# Patient Record
Sex: FEMALE | Race: BLACK OR AFRICAN AMERICAN | Employment: PART TIME | URBAN - METROPOLITAN AREA
[De-identification: names, ages, dates, MRNs, and addresses within clinical notes are randomized per-mention and may not be internally consistent; named-entity substitution may affect disease eponyms.]

---

## 2017-04-15 PROBLEM — K80.20 SYMPTOMATIC CHOLELITHIASIS: Status: ACTIVE | Noted: 2017-04-15

## 2018-10-17 PROBLEM — E66.01 CLASS 3 SEVERE OBESITY DUE TO EXCESS CALORIES WITHOUT SERIOUS COMORBIDITY WITH BODY MASS INDEX (BMI) OF 40.0 TO 44.9 IN ADULT (HCC): Status: ACTIVE | Noted: 2018-10-17

## 2018-10-17 PROBLEM — E66.813 CLASS 3 SEVERE OBESITY DUE TO EXCESS CALORIES WITHOUT SERIOUS COMORBIDITY WITH BODY MASS INDEX (BMI) OF 40.0 TO 44.9 IN ADULT: Status: ACTIVE | Noted: 2018-10-17

## 2018-10-17 PROBLEM — I10 ESSENTIAL HYPERTENSION: Status: ACTIVE | Noted: 2018-10-17

## 2018-10-17 PROBLEM — K21.9 GASTROESOPHAGEAL REFLUX DISEASE: Status: ACTIVE | Noted: 2018-10-17

## 2019-09-19 VITALS — OXYGEN SATURATION: 97 % | TEMPERATURE: 97.2 F | HEART RATE: 77 BPM

## 2019-09-20 ENCOUNTER — HOSPITAL ENCOUNTER (EMERGENCY)
Age: 24
Discharge: LEFT BEFORE DISCHARGE | End: 2019-09-20

## 2020-01-24 ENCOUNTER — HOSPITAL ENCOUNTER (EMERGENCY)
Age: 25
Discharge: HOME | End: 2020-01-25
Attending: EMERGENCY MEDICINE

## 2020-01-24 ENCOUNTER — APPOINTMENT (OUTPATIENT)
Dept: GENERAL RADIOLOGY | Age: 25
End: 2020-01-24

## 2020-01-24 VITALS
DIASTOLIC BLOOD PRESSURE: 87 MMHG | SYSTOLIC BLOOD PRESSURE: 142 MMHG | OXYGEN SATURATION: 100 % | HEIGHT: 64 IN | WEIGHT: 260 LBS | RESPIRATION RATE: 18 BRPM | HEART RATE: 87 BPM | TEMPERATURE: 97.6 F | BODY MASS INDEX: 44.39 KG/M2

## 2020-01-24 PROCEDURE — 93005 ELECTROCARDIOGRAM TRACING: CPT | Performed by: EMERGENCY MEDICINE

## 2020-01-24 PROCEDURE — 96374 THER/PROPH/DIAG INJ IV PUSH: CPT

## 2020-01-24 PROCEDURE — 71046 X-RAY EXAM CHEST 2 VIEWS: CPT

## 2020-01-24 PROCEDURE — 6360000002 HC RX W HCPCS: Performed by: EMERGENCY MEDICINE

## 2020-01-24 PROCEDURE — 80048 BASIC METABOLIC PNL TOTAL CA: CPT

## 2020-01-24 PROCEDURE — 2580000003 HC RX 258: Performed by: EMERGENCY MEDICINE

## 2020-01-24 PROCEDURE — 99285 EMERGENCY DEPT VISIT HI MDM: CPT

## 2020-01-24 PROCEDURE — 82553 CREATINE MB FRACTION: CPT

## 2020-01-24 PROCEDURE — 84484 ASSAY OF TROPONIN QUANT: CPT

## 2020-01-24 PROCEDURE — 82550 ASSAY OF CK (CPK): CPT

## 2020-01-24 PROCEDURE — 85378 FIBRIN DEGRADE SEMIQUANT: CPT

## 2020-01-24 RX ORDER — 0.9 % SODIUM CHLORIDE 0.9 %
1000 INTRAVENOUS SOLUTION INTRAVENOUS ONCE
Status: COMPLETED | OUTPATIENT
Start: 2020-01-24 | End: 2020-01-25

## 2020-01-24 RX ORDER — KETOROLAC TROMETHAMINE 30 MG/ML
15 INJECTION, SOLUTION INTRAMUSCULAR; INTRAVENOUS ONCE
Status: COMPLETED | OUTPATIENT
Start: 2020-01-24 | End: 2020-01-24

## 2020-01-24 RX ADMIN — SODIUM CHLORIDE 1000 ML: 9 INJECTION, SOLUTION INTRAVENOUS at 23:48

## 2020-01-24 RX ADMIN — KETOROLAC TROMETHAMINE 15 MG: 30 INJECTION, SOLUTION INTRAMUSCULAR; INTRAVENOUS at 23:48

## 2020-01-24 ASSESSMENT — PAIN SCALES - GENERAL
PAINLEVEL_OUTOF10: 6
PAINLEVEL_OUTOF10: 6

## 2020-01-24 ASSESSMENT — PAIN DESCRIPTION - PAIN TYPE: TYPE: ACUTE PAIN

## 2020-01-25 LAB
ANION GAP SERPL CALCULATED.3IONS-SCNC: 11 MMOL/L (ref 7–16)
BUN BLDV-MCNC: 11 MG/DL (ref 6–20)
CALCIUM SERPL-MCNC: 9.2 MG/DL (ref 8.6–10.2)
CHLORIDE BLD-SCNC: 101 MMOL/L (ref 98–107)
CK MB: 2 NG/ML (ref 0–4.3)
CO2: 23 MMOL/L (ref 22–29)
CREAT SERPL-MCNC: 0.8 MG/DL (ref 0.5–1)
D DIMER: <200 NG/ML DDU
EKG ATRIAL RATE: 98 BPM
EKG P AXIS: 39 DEGREES
EKG P-R INTERVAL: 150 MS
EKG Q-T INTERVAL: 362 MS
EKG QRS DURATION: 82 MS
EKG QTC CALCULATION (BAZETT): 462 MS
EKG R AXIS: 29 DEGREES
EKG T AXIS: 0 DEGREES
EKG VENTRICULAR RATE: 98 BPM
GFR AFRICAN AMERICAN: >60
GFR NON-AFRICAN AMERICAN: >60 ML/MIN/1.73
GLUCOSE BLD-MCNC: 87 MG/DL (ref 74–99)
POTASSIUM SERPL-SCNC: 3.6 MMOL/L (ref 3.5–5)
SODIUM BLD-SCNC: 135 MMOL/L (ref 132–146)
TOTAL CK: 451 U/L (ref 20–180)
TROPONIN: <0.01 NG/ML (ref 0–0.03)

## 2020-01-25 PROCEDURE — 93010 ELECTROCARDIOGRAM REPORT: CPT | Performed by: INTERNAL MEDICINE

## 2020-01-25 RX ORDER — KETOROLAC TROMETHAMINE 10 MG/1
10 TABLET, FILM COATED ORAL EVERY 8 HOURS PRN
Qty: 15 TABLET | Refills: 0 | Status: SHIPPED | OUTPATIENT
Start: 2020-01-25 | End: 2020-05-19

## 2020-01-25 ASSESSMENT — PAIN SCALES - GENERAL: PAINLEVEL_OUTOF10: 2

## 2020-01-25 NOTE — ED NOTES
Pt presting to ED with c/o R sided chest pain that extends into back and SOB that started with the chest pain. Pt also stated that she had numbness and tingling in R hand and R leg at the time, but denies any at this time. No neuro deficits noted. Pt stated she was drinking at the tie on onset. VSS and pt appearrs free of immediate distress. Cardiac monitor applied and continuous pulse ox applied. Boyfriend at bedside.       Amelai Pruett RN  01/25/20 0005

## 2020-01-25 NOTE — ED PROVIDER NOTES
HPI:  1/24/20,   Time: 11:08 PM         Jonel Braun is a 21 y.o. female presenting to the ED for chest pain and shortness of breath, beginning 1 hour ago. The complaint has been constant, moderate in severity, and worsened by movement of torso and also with deep breathing. Prior similar episodes patient denies diaphoresis or dizziness no abdominal pain vomiting or diarrhea no fever chills or night sweats or cough. No recent travel or surgery and is not on birth control    ROS:   Pertinent positives and negatives are stated within HPI, all other systems reviewed and are negative.  --------------------------------------------- PAST HISTORY ---------------------------------------------  Past Medical History:  has a past medical history of ADHD (attention deficit hyperactivity disorder), Hypertension, MDRO (multiple drug resistant organisms) resistance, Obesity, and Thyromegaly. Past Surgical History:  has a past surgical history that includes Cholecystectomy, laparoscopic (05/03/2017). Social History:  reports that she has been smoking. She has never used smokeless tobacco. She reports current alcohol use. She reports that she does not use drugs. Family History: family history includes Coronary Art Dis in her father; Hypertension in her mother. The patients home medications have been reviewed. Allergies: Banana;  Nickel; and Shellfish-derived products    -------------------------------------------------- RESULTS -------------------------------------------------  All laboratory and radiology results have been personally reviewed by myself   LABS:  Results for orders placed or performed during the hospital encounter of 01/24/20   Troponin   Result Value Ref Range    Troponin <0.01 0.00 - 0.03 ng/mL   CK   Result Value Ref Range    Total  (H) 20 - 180 U/L   CK MB   Result Value Ref Range    CK-MB 2.0 0.0 - 4.3 ng/mL   D-Dimer, Quantitative   Result Value Ref Range    D-Dimer, Quant <200 ng/mL DDU   Basic Metabolic Panel   Result Value Ref Range    Sodium 135 132 - 146 mmol/L    Potassium 3.6 3.5 - 5.0 mmol/L    Chloride 101 98 - 107 mmol/L    CO2 23 22 - 29 mmol/L    Anion Gap 11 7 - 16 mmol/L    Glucose 87 74 - 99 mg/dL    BUN 11 6 - 20 mg/dL    CREATININE 0.8 0.5 - 1.0 mg/dL    GFR Non-African American >60 >=60 mL/min/1.73    GFR African American >60     Calcium 9.2 8.6 - 10.2 mg/dL   EKG 12 Lead   Result Value Ref Range    Ventricular Rate 98 BPM    Atrial Rate 98 BPM    P-R Interval 150 ms    QRS Duration 82 ms    Q-T Interval 362 ms    QTc Calculation (Bazett) 462 ms    P Axis 39 degrees    R Axis 29 degrees    T Axis 0 degrees       RADIOLOGY:  Interpreted by Radiologist.  XR CHEST STANDARD (2 VW)    (Results Pending)       ------------------------- NURSING NOTES AND VITALS REVIEWED ---------------------------   The nursing notes within the ED encounter and vital signs as below have been reviewed. BP (!) 142/87   Pulse 87   Temp 97.6 °F (36.4 °C) (Oral)   Resp 18   Ht 5' 4\" (1.626 m)   Wt 260 lb (117.9 kg)   LMP 01/18/2020   SpO2 100%   BMI 44.63 kg/m²   Oxygen Saturation Interpretation: Normal      ---------------------------------------------------PHYSICAL EXAM--------------------------------------      Constitutional/General: Alert and oriented x3, well appearing, non toxic in NAD  Head: NC/AT  Eyes: PERRL, EOMI  Mouth: Oropharynx clear, handling secretions, no trismus  Neck: Supple, full ROM, no meningeal signs  Pulmonary: Lungs clear to auscultation bilaterally, no wheezes, rales, or rhonchi. Not in respiratory distress  Cardiovascular:  Regular rate and rhythm, no murmurs, gallops, or rubs. 2+ distal pulses  Abdomen: Soft, non tender, non distended,   Extremities: Moves all extremities x 4.  Warm and well perfused  Skin: warm and dry without rash  Neurologic: GCS 15,  Psych: Normal Affect  Chest wall: Reproducible right-sided parasternal tenderness to palpation    ------------------------------ ED COURSE/MEDICAL DECISION MAKING----------------------  Medications   ketorolac (TORADOL) injection 15 mg (15 mg Intravenous Given 20 5030)   0.9 % sodium chloride bolus (0 mLs Intravenous Stopped 20 0046)         Medical Decision Making:    Atypical chest pain but also increases with inspiration therefore rule out PE    Counseling: The emergency provider has spoken with the patient and discussed todays results, in addition to providing specific details for the plan of care and counseling regarding the diagnosis and prognosis. Questions are answered at this time and they are agreeable with the plan.      --------------------------------- IMPRESSION AND DISPOSITION ---------------------------------    IMPRESSION  1. Acute chest pain New Problem   2.  Chest wall pain New Problem       DISPOSITION  Disposition: Discharge to home  Patient condition is stable      EKG: Normal sinus rhythm with a rate of 98 bpm, normal EKG, no acute ischemic change or ectopy            Nanette Kirkland MD  [phone]

## 2020-05-19 ENCOUNTER — HOSPITAL ENCOUNTER (EMERGENCY)
Age: 25
Discharge: HOME | End: 2020-05-19
Attending: EMERGENCY MEDICINE
Payer: COMMERCIAL

## 2020-05-19 VITALS
DIASTOLIC BLOOD PRESSURE: 97 MMHG | BODY MASS INDEX: 46.95 KG/M2 | HEART RATE: 92 BPM | HEIGHT: 64 IN | OXYGEN SATURATION: 100 % | TEMPERATURE: 97.9 F | WEIGHT: 275 LBS | RESPIRATION RATE: 18 BRPM | SYSTOLIC BLOOD PRESSURE: 148 MMHG

## 2020-05-19 LAB
ABO/RH: NORMAL
ANION GAP SERPL CALCULATED.3IONS-SCNC: 9 MMOL/L (ref 7–16)
ANTIBODY SCREEN: NORMAL
BACTERIA: ABNORMAL /HPF
BASOPHILS ABSOLUTE: 0.03 E9/L (ref 0–0.2)
BASOPHILS RELATIVE PERCENT: 0.2 % (ref 0–2)
BILIRUBIN URINE: NEGATIVE
BLOOD, URINE: ABNORMAL
BUN BLDV-MCNC: 7 MG/DL (ref 6–20)
CALCIUM SERPL-MCNC: 8.9 MG/DL (ref 8.6–10.2)
CHLORIDE BLD-SCNC: 102 MMOL/L (ref 98–107)
CLARITY: CLEAR
CO2: 20 MMOL/L (ref 22–29)
COLOR: YELLOW
CREAT SERPL-MCNC: 0.7 MG/DL (ref 0.5–1)
EOSINOPHILS ABSOLUTE: 0.21 E9/L (ref 0.05–0.5)
EOSINOPHILS RELATIVE PERCENT: 1.5 % (ref 0–6)
EPITHELIAL CELLS, UA: ABNORMAL /HPF
GFR AFRICAN AMERICAN: >60
GFR NON-AFRICAN AMERICAN: >60 ML/MIN/1.73
GLUCOSE BLD-MCNC: 99 MG/DL (ref 74–99)
GLUCOSE URINE: NEGATIVE MG/DL
HCT VFR BLD CALC: 35.8 % (ref 34–48)
HEMOGLOBIN: 11 G/DL (ref 11.5–15.5)
IMMATURE GRANULOCYTES #: 0.06 E9/L
IMMATURE GRANULOCYTES %: 0.4 % (ref 0–5)
KETONES, URINE: NEGATIVE MG/DL
LEUKOCYTE ESTERASE, URINE: ABNORMAL
LYMPHOCYTES ABSOLUTE: 3.68 E9/L (ref 1.5–4)
LYMPHOCYTES RELATIVE PERCENT: 27.1 % (ref 20–42)
MCH RBC QN AUTO: 20.6 PG (ref 26–35)
MCHC RBC AUTO-ENTMCNC: 30.7 % (ref 32–34.5)
MCV RBC AUTO: 67.2 FL (ref 80–99.9)
MONOCYTES ABSOLUTE: 0.69 E9/L (ref 0.1–0.95)
MONOCYTES RELATIVE PERCENT: 5.1 % (ref 2–12)
NEUTROPHILS ABSOLUTE: 8.9 E9/L (ref 1.8–7.3)
NEUTROPHILS RELATIVE PERCENT: 65.7 % (ref 43–80)
NITRITE, URINE: NEGATIVE
PDW BLD-RTO: 17.4 FL (ref 11.5–15)
PH UA: 6.5 (ref 5–9)
PLATELET # BLD: 307 E9/L (ref 130–450)
PMV BLD AUTO: 10.1 FL (ref 7–12)
POTASSIUM REFLEX MAGNESIUM: 4.3 MMOL/L (ref 3.5–5)
PROTEIN UA: NEGATIVE MG/DL
RBC # BLD: 5.33 E12/L (ref 3.5–5.5)
RBC UA: ABNORMAL /HPF (ref 0–2)
SODIUM BLD-SCNC: 131 MMOL/L (ref 132–146)
SPECIFIC GRAVITY UA: 1.02 (ref 1–1.03)
UROBILINOGEN, URINE: 0.2 E.U./DL
WBC # BLD: 13.6 E9/L (ref 4.5–11.5)
WBC UA: ABNORMAL /HPF (ref 0–5)

## 2020-05-19 PROCEDURE — 81001 URINALYSIS AUTO W/SCOPE: CPT

## 2020-05-19 PROCEDURE — 36415 COLL VENOUS BLD VENIPUNCTURE: CPT

## 2020-05-19 PROCEDURE — 4500000002 HC ER NO CHARGE

## 2020-05-19 PROCEDURE — 86900 BLOOD TYPING SEROLOGIC ABO: CPT

## 2020-05-19 PROCEDURE — 86850 RBC ANTIBODY SCREEN: CPT

## 2020-05-19 PROCEDURE — 80048 BASIC METABOLIC PNL TOTAL CA: CPT

## 2020-05-19 PROCEDURE — 85025 COMPLETE CBC W/AUTO DIFF WBC: CPT

## 2020-05-19 PROCEDURE — 86901 BLOOD TYPING SEROLOGIC RH(D): CPT

## 2020-05-19 ASSESSMENT — ENCOUNTER SYMPTOMS
COUGH: 0
ABDOMINAL PAIN: 1
DIARRHEA: 0
EYE PAIN: 0
NAUSEA: 0
COLOR CHANGE: 0
BLOOD IN STOOL: 0
VOMITING: 0
EYE REDNESS: 0
SHORTNESS OF BREATH: 0
HEMATEMESIS: 0
ABDOMINAL DISTENTION: 0
CHEST TIGHTNESS: 0
CONSTIPATION: 0
TROUBLE SWALLOWING: 0

## 2020-05-19 ASSESSMENT — PAIN DESCRIPTION - DESCRIPTORS: DESCRIPTORS: ACHING

## 2020-05-19 ASSESSMENT — PAIN SCALES - GENERAL: PAINLEVEL_OUTOF10: 5

## 2020-05-19 ASSESSMENT — PAIN DESCRIPTION - LOCATION: LOCATION: ABDOMEN

## 2020-05-19 NOTE — ED PROVIDER NOTES
Patient is a 79-year-old female, G3, P0, 14 weeks gestation, presents via private vehicle from home for abdominal pain. Patient reports proximately 1 to 2 hours prior to arrival developing a \"cramping\" lower abdominal suprapubic pain. She reports she noted a small amount of dark red spotting in her underwear earlier in the day. She reports associated headache. She is taken nothing for alleviation of her symptoms. Nothing else seems to make it better or worse. The history is provided by the patient. Abdominal Pain   Pain location:  Suprapubic  Pain quality: cramping    Pain radiates to:  Does not radiate  Pain severity:  Moderate  Onset quality:  Sudden  Timing:  Intermittent  Progression:  Waxing and waning  Chronicity:  New  Context: not alcohol use, not awakening from sleep, not diet changes, not recent illness, not recent sexual activity, not sick contacts, not suspicious food intake and not trauma    Relieved by:  Nothing  Worsened by:  Nothing  Ineffective treatments:  None tried  Associated symptoms: vaginal bleeding    Associated symptoms: no anorexia, no chest pain, no chills, no constipation, no cough, no diarrhea, no fatigue, no fever, no hematemesis, no hematuria, no melena, no nausea, no shortness of breath, no vaginal discharge and no vomiting    Risk factors: obesity and pregnancy    Risk factors: no NSAID use         Review of Systems   Constitutional: Negative for appetite change, chills, fatigue and fever. HENT: Negative for congestion, mouth sores and trouble swallowing. Eyes: Negative for pain, redness and visual disturbance. Respiratory: Negative for cough, chest tightness and shortness of breath. Cardiovascular: Negative for chest pain and palpitations. Gastrointestinal: Positive for abdominal pain. Negative for abdominal distention, anorexia, blood in stool, constipation, diarrhea, hematemesis, melena, nausea and vomiting. Genitourinary: Positive for vaginal bleeding. Negative for flank pain, frequency, hematuria, urgency, vaginal discharge and vaginal pain. Musculoskeletal: Negative for arthralgias, myalgias, neck pain and neck stiffness. Skin: Negative for color change, pallor, rash and wound. Allergic/Immunologic: Negative for immunocompromised state. Neurological: Negative for dizziness, light-headedness and headaches. Hematological: Negative for adenopathy. Does not bruise/bleed easily. Physical Exam  Vitals signs and nursing note reviewed. Constitutional:       General: She is not in acute distress. Appearance: Normal appearance. She is well-developed. She is obese. She is not ill-appearing, toxic-appearing or diaphoretic. HENT:      Head: Normocephalic and atraumatic. Mouth/Throat:      Pharynx: No oropharyngeal exudate. Eyes:      General: No scleral icterus. Right eye: No discharge. Left eye: No discharge. Conjunctiva/sclera: Conjunctivae normal.      Pupils: Pupils are equal, round, and reactive to light. Neck:      Musculoskeletal: Normal range of motion and neck supple. Thyroid: No thyromegaly. Cardiovascular:      Rate and Rhythm: Normal rate and regular rhythm. Pulses: Normal pulses. Heart sounds: Normal heart sounds. No murmur. No friction rub. No gallop. Pulmonary:      Effort: Pulmonary effort is normal. No respiratory distress. Breath sounds: Normal breath sounds. No wheezing or rales. Abdominal:      General: Bowel sounds are normal. There is no distension or abdominal bruit. Palpations: Abdomen is soft. There is no mass. Tenderness: There is abdominal tenderness in the suprapubic area. There is no guarding or rebound. Negative signs include Torres's sign and McBurney's sign. Hernia: No hernia is present. Musculoskeletal: Normal range of motion. Lymphadenopathy:      Cervical: No cervical adenopathy. Skin:     General: Skin is warm and dry.       Capillary distress. [JL]      ED Course User Index  [JL] Myah Friendly, DO        ED Course as of May 19 1515   Tue May 19, 2020   0157 Transabdominal bedside ultrasound performed which shows intrauterine gestation with good fetal movements heart rate of approximately 160 bpm.  Will check blood type be terminated for possible RhoGam will check blood counts also check for signs of UTI. [JL]   0442 ABO Rh: O POS [JL]   0442 Hemoglobin Quant(!): 11.0 [JL]   0442 Patient's blood counts are stable. She has small amount of blood in the urine with bacteria but moderate amount of contamination with epithelial cells and no nitrites and less than 5 WBCs so do not suspect UTI at this point. She has a intrauterine gestation on bedside ultrasound that her bleeding may be indicative of a threatened miscarriage or impending miscarriage. Advised patient of findings and the need to follow-up with her OB/GYN for continued evaluation of her symptoms. Discussed pelvic rest. Patient advised of return precautions. Patient is O positive and thus does not need RhoGam.    [JL]   0443 Patient sleeping resting comfortably no distress. [JL]      ED Course User Index  [JL] Myah Friendly, DO       --------------------------------------------- PAST HISTORY ---------------------------------------------  Past Medical History:  has a past medical history of ADHD (attention deficit hyperactivity disorder), Hypertension, MDRO (multiple drug resistant organisms) resistance, Obesity, and Thyromegaly. Past Surgical History:  has a past surgical history that includes Cholecystectomy, laparoscopic (05/03/2017). Social History:  reports that she has quit smoking. She has never used smokeless tobacco. She reports previous alcohol use. She reports that she does not use drugs. Family History: family history includes Coronary Art Dis in her father; Hypertension in her mother. The patients home medications have been reviewed.     Allergies: >60 >=60 mL/min/1.73    GFR African American >60     Calcium 8.9 8.6 - 10.2 mg/dL   Microscopic Urinalysis   Result Value Ref Range    WBC, UA 1-3 0 - 5 /HPF    RBC, UA 2-5 0 - 2 /HPF    Epithelial Cells, UA MODERATE /HPF    Bacteria, UA MODERATE (A) None Seen /HPF   TYPE AND SCREEN   Result Value Ref Range    ABO/Rh O POS     Antibody Screen NEG        Radiology:  US Ed Fast Abdomen Limited    (Results Pending)       ------------------------- NURSING NOTES AND VITALS REVIEWED ---------------------------  Date / Time Roomed:  5/19/2020  1:38 AM  ED Bed Assignment:  13/13    The nursing notes within the ED encounter and vital signs as below have been reviewed. BP (!) 148/97   Pulse 92   Temp 97.9 °F (36.6 °C)   Resp 18   Ht 5' 4\" (1.626 m)   Wt 275 lb (124.7 kg)   LMP 02/12/2020   SpO2 100%   BMI 47.20 kg/m²   Oxygen Saturation Interpretation: Normal      ------------------------------------------ PROGRESS NOTES ------------------------------------------  2:00 AM EDT  I have spoken with the patient and discussed todays results, in addition to providing specific details for the plan of care and counseling regarding the diagnosis and prognosis. Their questions are answered at this time and they are agreeable with the plan. I discussed at length with them reasons for immediate return here for re evaluation. They will followup with their OB by calling their office tomorrow. --------------------------------- ADDITIONAL PROVIDER NOTES ---------------------------------  At this time the patient is without objective evidence of an acute process requiring hospitalization or inpatient management. They have remained hemodynamically stable throughout their entire ED visit and are stable for discharge with outpatient follow-up. The plan has been discussed in detail and they are aware of the specific conditions for emergent return, as well as the importance of follow-up.       Discharge Medication List as of

## 2020-08-27 ENCOUNTER — HOSPITAL ENCOUNTER (OUTPATIENT)
Age: 25
Discharge: HOME | End: 2020-08-27
Attending: OBSTETRICS & GYNECOLOGY | Admitting: OBSTETRICS & GYNECOLOGY
Payer: COMMERCIAL

## 2020-08-27 VITALS
HEIGHT: 64 IN | RESPIRATION RATE: 18 BRPM | TEMPERATURE: 97.9 F | WEIGHT: 285 LBS | BODY MASS INDEX: 48.65 KG/M2 | DIASTOLIC BLOOD PRESSURE: 64 MMHG | SYSTOLIC BLOOD PRESSURE: 125 MMHG | HEART RATE: 97 BPM

## 2020-08-27 PROBLEM — O26.899 ABDOMINAL PAIN AFFECTING PREGNANCY, ANTEPARTUM: Status: ACTIVE | Noted: 2020-08-27

## 2020-08-27 PROBLEM — R10.9 ABDOMINAL PAIN AFFECTING PREGNANCY, ANTEPARTUM: Status: ACTIVE | Noted: 2020-08-27

## 2020-08-27 PROCEDURE — 99213 OFFICE O/P EST LOW 20 MIN: CPT | Performed by: NURSE PRACTITIONER

## 2020-08-27 PROCEDURE — 99201 HC NEW PT, OUTPT VISIT LEVEL 1: CPT

## 2020-08-27 RX ORDER — PRENATAL WITH FERROUS FUM AND FOLIC ACID 3080; 920; 120; 400; 22; 1.84; 3; 20; 10; 1; 12; 200; 27; 25; 2 [IU]/1; [IU]/1; MG/1; [IU]/1; MG/1; MG/1; MG/1; MG/1; MG/1; MG/1; UG/1; MG/1; MG/1; MG/1; MG/1
1 TABLET ORAL DAILY
COMMUNITY
End: 2020-11-20 | Stop reason: ALTCHOICE

## 2020-08-27 NOTE — PROGRESS NOTES
Called Dr. Juliana Lofton, updated on patient status. FHR reassuring 145 with moderate variability and accels, no ctx's. New orders obtained to educate patient on tylenol and tums for heartburn, OK for patient to be discharged home.

## 2020-08-27 NOTE — PROGRESS NOTES
Discharge instructions given to patient, verbalizes understanding. Educated on the importance of taking tylenol as needed and tums for heartburn. Also instructed to keep her upcoming appointments with her Dr. No other complaints at this time. Ambulatory upon discharge with significant other.

## 2020-08-27 NOTE — PROGRESS NOTES
presents at 28w4d for c/o mid to lower back pain and upper abdominal pain that started when she went to the bathroom at about 0300 this morning. Describes the pain as dull 7/10 on a pain scale and states that it comes and goes. Denies VB/LOF. States +FM. Placed on EFM; will have house officer evaluate patient.

## 2020-10-20 ENCOUNTER — HOSPITAL ENCOUNTER (OUTPATIENT)
Age: 25
Setting detail: OBSERVATION
Discharge: HOME | End: 2020-10-20
Attending: OBSTETRICS & GYNECOLOGY | Admitting: OBSTETRICS & GYNECOLOGY
Payer: COMMERCIAL

## 2020-10-20 VITALS — RESPIRATION RATE: 16 BRPM | HEART RATE: 91 BPM | DIASTOLIC BLOOD PRESSURE: 59 MMHG | SYSTOLIC BLOOD PRESSURE: 142 MMHG

## 2020-10-20 PROBLEM — Z3A.36 PREGNANCY WITH 36 COMPLETED WEEKS GESTATION: Status: ACTIVE | Noted: 2020-10-20

## 2020-10-20 LAB
ALBUMIN SERPL-MCNC: 3.6 G/DL (ref 3.5–5.2)
ALP BLD-CCNC: 145 U/L (ref 35–104)
ALT SERPL-CCNC: 9 U/L (ref 0–32)
AMORPHOUS: ABNORMAL
ANION GAP SERPL CALCULATED.3IONS-SCNC: 9 MMOL/L (ref 7–16)
AST SERPL-CCNC: 19 U/L (ref 0–31)
BACTERIA: ABNORMAL /HPF
BILIRUB SERPL-MCNC: <0.2 MG/DL (ref 0–1.2)
BILIRUBIN URINE: NEGATIVE
BLOOD, URINE: ABNORMAL
BUN BLDV-MCNC: 4 MG/DL (ref 6–20)
CALCIUM SERPL-MCNC: 9 MG/DL (ref 8.6–10.2)
CHLORIDE BLD-SCNC: 103 MMOL/L (ref 98–107)
CLARITY: CLEAR
CO2: 22 MMOL/L (ref 22–29)
COLOR: YELLOW
CREAT SERPL-MCNC: 0.7 MG/DL (ref 0.5–1)
CREATININE URINE: 133 MG/DL (ref 29–226)
EPITHELIAL CELLS, UA: ABNORMAL /HPF
GFR AFRICAN AMERICAN: >60
GFR NON-AFRICAN AMERICAN: >60 ML/MIN/1.73
GLUCOSE BLD-MCNC: 81 MG/DL (ref 74–99)
GLUCOSE URINE: NEGATIVE MG/DL
HCT VFR BLD CALC: 34.9 % (ref 34–48)
HEMOGLOBIN: 10.2 G/DL (ref 11.5–15.5)
KETONES, URINE: NEGATIVE MG/DL
LEUKOCYTE ESTERASE, URINE: NEGATIVE
MCH RBC QN AUTO: 19.7 PG (ref 26–35)
MCHC RBC AUTO-ENTMCNC: 29.2 % (ref 32–34.5)
MCV RBC AUTO: 67.5 FL (ref 80–99.9)
MUCUS: PRESENT /LPF
NITRITE, URINE: NEGATIVE
PDW BLD-RTO: 19.1 FL (ref 11.5–15)
PH UA: 7 (ref 5–9)
PLATELET # BLD: 228 E9/L (ref 130–450)
PMV BLD AUTO: ABNORMAL FL (ref 7–12)
POTASSIUM SERPL-SCNC: 4.2 MMOL/L (ref 3.5–5)
PROTEIN PROTEIN: 20 MG/DL (ref 0–12)
PROTEIN UA: NEGATIVE MG/DL
PROTEIN/CREAT RATIO: 0.2
PROTEIN/CREAT RATIO: 0.2 (ref 0–0.2)
RBC # BLD: 5.17 E12/L (ref 3.5–5.5)
RBC UA: ABNORMAL /HPF (ref 0–2)
SODIUM BLD-SCNC: 134 MMOL/L (ref 132–146)
SPECIFIC GRAVITY UA: 1.02 (ref 1–1.03)
TOTAL PROTEIN: 6.9 G/DL (ref 6.4–8.3)
UROBILINOGEN, URINE: 0.2 E.U./DL
WBC # BLD: 12.4 E9/L (ref 4.5–11.5)
WBC UA: ABNORMAL /HPF (ref 0–5)

## 2020-10-20 PROCEDURE — G0378 HOSPITAL OBSERVATION PER HR: HCPCS

## 2020-10-20 PROCEDURE — G0379 DIRECT REFER HOSPITAL OBSERV: HCPCS

## 2020-10-20 PROCEDURE — 36415 COLL VENOUS BLD VENIPUNCTURE: CPT

## 2020-10-20 PROCEDURE — 81001 URINALYSIS AUTO W/SCOPE: CPT

## 2020-10-20 PROCEDURE — 80053 COMPREHEN METABOLIC PANEL: CPT

## 2020-10-20 PROCEDURE — 82570 ASSAY OF URINE CREATININE: CPT

## 2020-10-20 PROCEDURE — 85027 COMPLETE CBC AUTOMATED: CPT

## 2020-10-20 PROCEDURE — 99214 OFFICE O/P EST MOD 30 MIN: CPT | Performed by: ADVANCED PRACTICE MIDWIFE

## 2020-10-20 PROCEDURE — 84156 ASSAY OF PROTEIN URINE: CPT

## 2020-10-20 NOTE — PROGRESS NOTES
january saab at bedside discusses plan of care for discharge with patient , patient verbalizes understanding

## 2020-10-20 NOTE — DISCHARGE SUMMARY
Department of Obstetrics and Gynecology  Discharge Summary    Patient:  Sejal Cash,Second Floor [facility] Record Number:  [id number]    Admit Date:  10/20/2020 11:51 AM    Lab Results   Component Value Date    WBC 12.4 (H) 10/20/2020    HGB 10.2 (L) 10/20/2020    HCT 34.9 10/20/2020    MCV 67.5 (L) 10/20/2020     10/20/2020      Lab Results   Component Value Date     10/20/2020    K 4.2 10/20/2020     10/20/2020    CO2 22 10/20/2020    BUN 4 (L) 10/20/2020    CREATININE 0.7 10/20/2020    GLUCOSE 81 10/20/2020    CALCIUM 9.0 10/20/2020    PROT 6.9 10/20/2020    LABALBU 3.6 10/20/2020    BILITOT <0.2 10/20/2020    ALKPHOS 145 (H) 10/20/2020    AST 19 10/20/2020    ALT 9 10/20/2020    LABGLOM >60 10/20/2020    GFRAA >60 10/20/2020     No results found for: Cecil Mikhail   Lab Results   Component Value Date    COLORU Yellow 10/20/2020    PROTEINU Negative 10/20/2020    PHUR 7.0 10/20/2020    WBCUA NONE 10/20/2020    RBCUA 0-1 10/20/2020    MUCUS Present 10/20/2020    BACTERIA RARE 10/20/2020    CLARITYU Clear 10/20/2020    SPECGRAV 1.020 10/20/2020    LEUKOCYTESUR Negative 10/20/2020    UROBILINOGEN 0.2 10/20/2020    BILIRUBINUR Negative 10/20/2020    BLOODU TRACE 10/20/2020    GLUCOSEU Negative 10/20/2020    AMORPHOUS RARE 10/20/2020       BP (!) 142/59   Pulse 91   Resp 16   LMP 02/12/2020     Discharge Date: 10-  Discharge Condition: stable  Final Diagnosis:   Active Problems:    Pregnancy with 36 completed weeks gestation    Transient hypertension of pregnancy in third trimester  Resolved Problems:    * No resolved hospital problems.  *    FU in office 1 week scheduled appointment       Electronically signed by VARGAS Jones CNM on 10/20/2020 at 3:27 PM

## 2020-10-20 NOTE — PROGRESS NOTES
Patient here from Dr. Brian Díaz office, states her BP was elevated there. No co's offered. Perceives fetal movement, denies bleeding or leaking fluid or contractions.

## 2020-10-20 NOTE — H&P
Department of Obstetrics and Gynecology  [facility]  History and Physical        CHIEF COMPLAINT:       HISTORY OF PRESENT ILLNESS:    The patient is a 21 y.o. female , Patient's last menstrual period was 2020.,  at 36w2d. OB History        1    Para        Term                AB        Living           SAB        TAB        Ectopic        Molar        Multiple        Live Births                Patient presents from office at routine PNV increased BP. Pt denies HA blurred vision or epigastric pain. Pt denies VB LOF reports FM. Estimated Due Date: Estimated Date of Delivery: 11/15/20    PRENATAL CARE:    Complicated by:   Patient Active Problem List   Diagnosis Code    Symptomatic cholelithiasis K80.20    Class 3 severe obesity due to excess calories without serious comorbidity with body mass index (BMI) of 40.0 to 44.9 in adult ([facility] E66.01, Z68.41    Gastroesophageal reflux disease K21.9    Essential hypertension I10    Abdominal pain affecting pregnancy, antepartum O26.899, R10.9       PAST OB HISTORY  OB History        1    Para        Term                AB        Living           SAB        TAB        Ectopic        Molar        Multiple        Live Births                    Past Medical History:        Diagnosis Date    ADHD (attention deficit hyperactivity disorder)     DR Mervin Sauceda    Hypertension     MDRO (multiple drug resistant organisms) resistance     Obesity     Thyromegaly      Past Surgical History:        Procedure Laterality Date    CHOLECYSTECTOMY, LAPAROSCOPIC  2017     Social History:    TOBACCO:   reports that she has quit smoking. She has never used smokeless tobacco.  ETOH:   reports previous alcohol use. DRUGS:   reports no history of drug use.   Family History:       Problem Relation Age of Onset    Hypertension Mother     Coronary Art Dis Father      Medications Prior to Admission:  Medications Prior to Admission: Prenatal Vit-Fe Fumarate-FA (PRENATAL VITAMIN) 27-1 MG TABS tablet, Take 1 tablet by mouth daily  Allergies:  Banana; Nickel; and Shellfish-derived products    Review of Systems:   Ears, nose, mouth, throat, and face: negative  Respiratory: negative  Cardiovascular: negative  Gastrointestinal: negative  Genitourinary:negative  Integument/breast: negative  Hematologic/lymphatic: negative  Musculoskeletal:negative  Neurological: negative  Behavioral/Psych: negative  Endocrine: negative  Allergic/Immunologic: negative  Psychosocial: negative    PHYSICAL EXAM:    General appearance:  awake, alert, cooperative, no apparent distress, and appears stated age  Neurologic:  Awake, alert, oriented to name, place and time. Cranial nerves II-XII are grossly intact. and gait is normal.  Lungs:  No increased work of breathing, good air exchange, clear to auscultation bilaterally, no crackles or wheezing  Heart:  regular rate and rhythm and no murmur noted  Abdomen:  Gravid with normal bowel sounds, soft, non-distended, non-tender, no masses palpated  Pelvis:  External Genitalia: General appearance; normal, Hair distribution; normal, Lesions absent. Fetal heart rate:  Baseline Heart Rate 150                               Accelerations: Present Decelerations Absent      Cervix:   In office closed     Contraction frequency:  None   Membranes:  Intact    BP (!) 141/74   Pulse 100   Resp 16   LMP 02/12/2020         ASSESSMENT AND PLAN:  IUP 36 2/7  FHR category 1   Increased BP    Observation EFM   CBC CMP UA PCR    Electronically signed by VARGAS Munoz CNM on 10/20/2020 at 12:46 PM

## 2020-10-25 ENCOUNTER — HOSPITAL ENCOUNTER (OUTPATIENT)
Age: 25
Discharge: HOME | End: 2020-10-25
Attending: OBSTETRICS & GYNECOLOGY | Admitting: OBSTETRICS & GYNECOLOGY
Payer: COMMERCIAL

## 2020-10-25 VITALS
RESPIRATION RATE: 16 BRPM | TEMPERATURE: 98.1 F | DIASTOLIC BLOOD PRESSURE: 75 MMHG | SYSTOLIC BLOOD PRESSURE: 129 MMHG | HEART RATE: 93 BPM

## 2020-10-25 PROCEDURE — 59025 FETAL NON-STRESS TEST: CPT

## 2020-10-25 NOTE — PROGRESS NOTES
Dr Eulogio Ashraf notified of pt arrival and reviewed graph with dr. Meenakshi Moore for pt discharge.

## 2020-10-28 ENCOUNTER — HOSPITAL ENCOUNTER (OUTPATIENT)
Age: 25
Discharge: HOME | End: 2020-10-28
Attending: OBSTETRICS & GYNECOLOGY | Admitting: OBSTETRICS & GYNECOLOGY
Payer: COMMERCIAL

## 2020-10-28 VITALS
SYSTOLIC BLOOD PRESSURE: 118 MMHG | TEMPERATURE: 98.4 F | RESPIRATION RATE: 14 BRPM | HEART RATE: 106 BPM | DIASTOLIC BLOOD PRESSURE: 64 MMHG

## 2020-10-28 PROCEDURE — 59025 FETAL NON-STRESS TEST: CPT

## 2020-11-03 ENCOUNTER — HOSPITAL ENCOUNTER (OUTPATIENT)
Age: 25
Setting detail: OBSERVATION
Discharge: HOME | End: 2020-11-03
Attending: OBSTETRICS & GYNECOLOGY | Admitting: OBSTETRICS & GYNECOLOGY
Payer: COMMERCIAL

## 2020-11-03 VITALS
HEART RATE: 112 BPM | RESPIRATION RATE: 16 BRPM | SYSTOLIC BLOOD PRESSURE: 124 MMHG | TEMPERATURE: 98.2 F | DIASTOLIC BLOOD PRESSURE: 69 MMHG

## 2020-11-03 PROBLEM — O26.93 COMPLICATION OF PREGNANCY IN THIRD TRIMESTER: Status: ACTIVE | Noted: 2020-11-03

## 2020-11-03 LAB
ALBUMIN SERPL-MCNC: 3.6 G/DL (ref 3.5–5.2)
ALP BLD-CCNC: 165 U/L (ref 35–104)
ALT SERPL-CCNC: 10 U/L (ref 0–32)
ANION GAP SERPL CALCULATED.3IONS-SCNC: 13 MMOL/L (ref 7–16)
AST SERPL-CCNC: 18 U/L (ref 0–31)
BACTERIA: ABNORMAL /HPF
BILIRUB SERPL-MCNC: 0.2 MG/DL (ref 0–1.2)
BILIRUBIN URINE: NEGATIVE
BLOOD, URINE: NEGATIVE
BUN BLDV-MCNC: 4 MG/DL (ref 6–20)
CALCIUM SERPL-MCNC: 9.1 MG/DL (ref 8.6–10.2)
CHLORIDE BLD-SCNC: 100 MMOL/L (ref 98–107)
CLARITY: CLEAR
CO2: 20 MMOL/L (ref 22–29)
COLOR: YELLOW
CREAT SERPL-MCNC: 0.6 MG/DL (ref 0.5–1)
CREATININE URINE: 115 MG/DL (ref 29–226)
EPITHELIAL CELLS, UA: ABNORMAL /HPF
GFR AFRICAN AMERICAN: >60
GFR NON-AFRICAN AMERICAN: >60 ML/MIN/1.73
GLUCOSE BLD-MCNC: 121 MG/DL (ref 74–99)
GLUCOSE URINE: NEGATIVE MG/DL
HCT VFR BLD CALC: 32.1 % (ref 34–48)
HEMOGLOBIN: 9.9 G/DL (ref 11.5–15.5)
KETONES, URINE: NEGATIVE MG/DL
LEUKOCYTE ESTERASE, URINE: NEGATIVE
MCH RBC QN AUTO: 20 PG (ref 26–35)
MCHC RBC AUTO-ENTMCNC: 30.8 % (ref 32–34.5)
MCV RBC AUTO: 64.7 FL (ref 80–99.9)
NITRITE, URINE: NEGATIVE
PDW BLD-RTO: 19 FL (ref 11.5–15)
PH UA: 7 (ref 5–9)
PLATELET # BLD: 230 E9/L (ref 130–450)
PMV BLD AUTO: ABNORMAL FL (ref 7–12)
POTASSIUM SERPL-SCNC: 4 MMOL/L (ref 3.5–5)
PROTEIN PROTEIN: 20 MG/DL (ref 0–12)
PROTEIN UA: NEGATIVE MG/DL
PROTEIN/CREAT RATIO: 0.2
PROTEIN/CREAT RATIO: 0.2 (ref 0–0.2)
RBC # BLD: 4.96 E12/L (ref 3.5–5.5)
RBC UA: ABNORMAL /HPF (ref 0–2)
SODIUM BLD-SCNC: 133 MMOL/L (ref 132–146)
SPECIFIC GRAVITY UA: 1.02 (ref 1–1.03)
TOTAL PROTEIN: 6.8 G/DL (ref 6.4–8.3)
UROBILINOGEN, URINE: 0.2 E.U./DL
WBC # BLD: 11.6 E9/L (ref 4.5–11.5)
WBC UA: ABNORMAL /HPF (ref 0–5)

## 2020-11-03 PROCEDURE — 81001 URINALYSIS AUTO W/SCOPE: CPT

## 2020-11-03 PROCEDURE — 85027 COMPLETE CBC AUTOMATED: CPT

## 2020-11-03 PROCEDURE — 99214 OFFICE O/P EST MOD 30 MIN: CPT | Performed by: ADVANCED PRACTICE MIDWIFE

## 2020-11-03 PROCEDURE — 36415 COLL VENOUS BLD VENIPUNCTURE: CPT

## 2020-11-03 PROCEDURE — 84156 ASSAY OF PROTEIN URINE: CPT

## 2020-11-03 PROCEDURE — 80053 COMPREHEN METABOLIC PANEL: CPT

## 2020-11-03 PROCEDURE — G0379 DIRECT REFER HOSPITAL OBSERV: HCPCS

## 2020-11-03 PROCEDURE — 82570 ASSAY OF URINE CREATININE: CPT

## 2020-11-03 PROCEDURE — G0378 HOSPITAL OBSERVATION PER HR: HCPCS

## 2020-11-03 NOTE — DISCHARGE SUMMARY
Department of Obstetrics and Gynecology  Discharge Summary    Patient:  Sejal Cash,Second Floor [facility] Record Number:  [id number]    Admit Date:  11/3/2020 12:40 PM    Lab Results   Component Value Date    WBC 11.6 (H) 11/03/2020    HGB 9.9 (L) 11/03/2020    HCT 32.1 (L) 11/03/2020    MCV 64.7 (L) 11/03/2020     11/03/2020      Lab Results   Component Value Date     11/03/2020    K 4.0 11/03/2020     11/03/2020    CO2 20 (L) 11/03/2020    BUN 4 (L) 11/03/2020    CREATININE 0.6 11/03/2020    GLUCOSE 121 (H) 11/03/2020    CALCIUM 9.1 11/03/2020    PROT 6.8 11/03/2020    LABALBU 3.6 11/03/2020    BILITOT 0.2 11/03/2020    ALKPHOS 165 (H) 11/03/2020    AST 18 11/03/2020    ALT 10 11/03/2020    LABGLOM >60 11/03/2020    GFRAA >60 11/03/2020     No results found for: LABURIC, URICACID   Lab Results   Component Value Date    COLORU Yellow 11/03/2020    PROTEINU Negative 11/03/2020    PHUR 7.0 11/03/2020    WBCUA 0-1 11/03/2020    RBCUA 0-1 11/03/2020    MUCUS Present 10/20/2020    BACTERIA FEW 11/03/2020    CLARITYU Clear 11/03/2020    SPECGRAV 1.020 11/03/2020    LEUKOCYTESUR Negative 11/03/2020    UROBILINOGEN 0.2 11/03/2020    BILIRUBINUR Negative 11/03/2020    BLOODU Negative 11/03/2020    GLUCOSEU Negative 11/03/2020    AMORPHOUS RARE 10/20/2020       /69   Pulse 112   Temp 98.2 °F (36.8 °C) (Oral)   Resp 16   LMP 02/12/2020     Discharge Date: 11/3/2020  2:38 PM    Discharge Condition: stable  Final Diagnosis:   IUP 38 2/7  FHR category 1   Transient increased BP     DC home   Appointment in office 11/10       Electronically signed by VARGAS Munoz CNM on 11/3/2020 at 2:40 PM

## 2020-11-03 NOTE — H&P
Department of Obstetrics and Gynecology   Obstetrics History and Physical        CHIEF COMPLAINT:  Increased BP      The patient is a 21 y.o. female , Patient's last menstrual period was 2020.,  at 38w2d. OB History        1    Para        Term                AB        Living           SAB        TAB        Ectopic        Molar        Multiple        Live Births                Patient presents from office with incidental finding of increased BP. Pt denies blurred vision, headache, or epigastric pain. Pt denies vaginal bleeding, leaking fluid reports fetal movement. Estimated Due Date: Estimated Date of Delivery: 11/15/20    PRENATAL CARE:    Complicated by:   Patient Active Problem List   Diagnosis Code    Symptomatic cholelithiasis K80.20    Class 3 severe obesity due to excess calories without serious comorbidity with body mass index (BMI) of 40.0 to 44.9 in adult ([facility] E66.01, Z68.41    Gastroesophageal reflux disease K21.9    Essential hypertension I10    Abdominal pain affecting pregnancy, antepartum O26.899, R10.9    Pregnancy with 39 completed weeks gestation Z3A.36    Transient hypertension of pregnancy in third trimester T76.8    Complication of pregnancy in third trimester O26.93       PAST OB HISTORY  OB History        1    Para        Term                AB        Living           SAB        TAB        Ectopic        Molar        Multiple        Live Births                    Past Medical History:        Diagnosis Date    ADHD (attention deficit hyperactivity disorder)     DR Romie Roca    Hypertension     MDRO (multiple drug resistant organisms) resistance     Obesity     Thyromegaly      Past Surgical History:        Procedure Laterality Date    CHOLECYSTECTOMY, LAPAROSCOPIC  2017     Social History:    TOBACCO:   reports that she has quit smoking. She has never used smokeless tobacco.  ETOH:   reports previous alcohol use.   DRUGS:   reports no history of drug use. Family History:       Problem Relation Age of Onset    Hypertension Mother     Coronary Art Dis Father      Medications Prior to Admission:  No medications prior to admission. Allergies:  Banana; Nickel; and Shellfish-derived products    Review of Systems:   Ears, nose, mouth, throat, and face: negative  Respiratory: negative  Cardiovascular: negative  Gastrointestinal: negative  Genitourinary:negative  Integument/breast: negative  Hematologic/lymphatic: negative  Musculoskeletal:negative  Neurological: negative  Behavioral/Psych: negative  Endocrine: negative  Allergic/Immunologic: negative  Psychosocial: negative    PHYSICAL EXAM:    General appearance:  awake, alert, cooperative, no apparent distress, and appears stated age  Neurologic:  Awake, alert, oriented to name, place and time. Cranial nerves II-XII are grossly intact. and gait is normal.  Lungs:  No increased work of breathing, good air exchange, clear to auscultation bilaterally, no crackles or wheezing  Heart:  regular rate and rhythm and no murmur noted  Abdomen:  Gravid with normal bowel sounds, soft, non-distended, non-tender, no masses palpated    Fetal heart rate:  Category 1   Cervix:    Deferrred     Contraction frequency:  None   Membranes:  Intact    /69   Pulse 112   Temp 98.2 °F (36.8 °C) (Oral)   Resp 16   LMP 02/12/2020     ASSESSMENT AND PLAN:  IUP 38 2/7  FHR category 1   Transient hypertension     Observation   [facility]   PI labs      Electronically signed by VARGAS Shannon CNM on 11/3/2020 at 2:49 PM

## 2020-11-14 ENCOUNTER — HOSPITAL ENCOUNTER (INPATIENT)
Age: 25
LOS: 3 days | Discharge: HOME | End: 2020-11-17
Attending: OBSTETRICS & GYNECOLOGY | Admitting: OBSTETRICS & GYNECOLOGY
Payer: COMMERCIAL

## 2020-11-14 PROBLEM — O16.3 HYPERTENSION AFFECTING PREGNANCY IN THIRD TRIMESTER: Status: ACTIVE | Noted: 2020-11-14

## 2020-11-14 LAB
ABO/RH: NORMAL
ALBUMIN SERPL-MCNC: 3.8 G/DL (ref 3.5–5.2)
ALP BLD-CCNC: 190 U/L (ref 35–104)
ALT SERPL-CCNC: 9 U/L (ref 0–32)
AMPHETAMINE SCREEN, URINE: NOT DETECTED
ANION GAP SERPL CALCULATED.3IONS-SCNC: 12 MMOL/L (ref 7–16)
ANTIBODY SCREEN: NORMAL
AST SERPL-CCNC: 18 U/L (ref 0–31)
BARBITURATE SCREEN URINE: NOT DETECTED
BENZODIAZEPINE SCREEN, URINE: NOT DETECTED
BILIRUB SERPL-MCNC: <0.2 MG/DL (ref 0–1.2)
BUN BLDV-MCNC: 6 MG/DL (ref 6–20)
CALCIUM SERPL-MCNC: 9.4 MG/DL (ref 8.6–10.2)
CANNABINOID SCREEN URINE: NOT DETECTED
CHLORIDE BLD-SCNC: 103 MMOL/L (ref 98–107)
CO2: 22 MMOL/L (ref 22–29)
COCAINE METABOLITE SCREEN URINE: NOT DETECTED
CREAT SERPL-MCNC: 0.6 MG/DL (ref 0.5–1)
FENTANYL SCREEN, URINE: NOT DETECTED
GFR AFRICAN AMERICAN: >60
GFR NON-AFRICAN AMERICAN: >60 ML/MIN/1.73
GLUCOSE BLD-MCNC: 79 MG/DL (ref 74–99)
HCT VFR BLD CALC: 33.9 % (ref 34–48)
HEMOGLOBIN: 10.2 G/DL (ref 11.5–15.5)
Lab: NORMAL
MCH RBC QN AUTO: 20 PG (ref 26–35)
MCHC RBC AUTO-ENTMCNC: 30.1 % (ref 32–34.5)
MCV RBC AUTO: 66.6 FL (ref 80–99.9)
METHADONE SCREEN, URINE: NOT DETECTED
OPIATE SCREEN URINE: NOT DETECTED
OXYCODONE URINE: NOT DETECTED
PDW BLD-RTO: 20.7 FL (ref 11.5–15)
PHENCYCLIDINE SCREEN URINE: NOT DETECTED
PLATELET # BLD: 221 E9/L (ref 130–450)
PMV BLD AUTO: ABNORMAL FL (ref 7–12)
POTASSIUM SERPL-SCNC: 4.3 MMOL/L (ref 3.5–5)
RBC # BLD: 5.09 E12/L (ref 3.5–5.5)
SODIUM BLD-SCNC: 137 MMOL/L (ref 132–146)
TOTAL PROTEIN: 7.2 G/DL (ref 6.4–8.3)
WBC # BLD: 12.5 E9/L (ref 4.5–11.5)

## 2020-11-14 PROCEDURE — 86850 RBC ANTIBODY SCREEN: CPT

## 2020-11-14 PROCEDURE — 6360000002 HC RX W HCPCS: Performed by: OBSTETRICS & GYNECOLOGY

## 2020-11-14 PROCEDURE — 86901 BLOOD TYPING SEROLOGIC RH(D): CPT

## 2020-11-14 PROCEDURE — 85027 COMPLETE CBC AUTOMATED: CPT

## 2020-11-14 PROCEDURE — 86900 BLOOD TYPING SEROLOGIC ABO: CPT

## 2020-11-14 PROCEDURE — 80053 COMPREHEN METABOLIC PANEL: CPT

## 2020-11-14 PROCEDURE — 1220000001 HC SEMI PRIVATE L&D R&B

## 2020-11-14 PROCEDURE — 3E0P7VZ INTRODUCTION OF HORMONE INTO FEMALE REPRODUCTIVE, VIA NATURAL OR ARTIFICIAL OPENING: ICD-10-PCS | Performed by: OBSTETRICS & GYNECOLOGY

## 2020-11-14 PROCEDURE — 6370000000 HC RX 637 (ALT 250 FOR IP): Performed by: OBSTETRICS & GYNECOLOGY

## 2020-11-14 PROCEDURE — 80307 DRUG TEST PRSMV CHEM ANLYZR: CPT

## 2020-11-14 PROCEDURE — 2580000003 HC RX 258: Performed by: OBSTETRICS & GYNECOLOGY

## 2020-11-14 RX ORDER — ONDANSETRON 2 MG/ML
4 INJECTION INTRAMUSCULAR; INTRAVENOUS EVERY 6 HOURS PRN
Status: DISCONTINUED | OUTPATIENT
Start: 2020-11-14 | End: 2020-11-15

## 2020-11-14 RX ORDER — SODIUM CHLORIDE 0.9 % (FLUSH) 0.9 %
10 SYRINGE (ML) INJECTION PRN
Status: DISCONTINUED | OUTPATIENT
Start: 2020-11-14 | End: 2020-11-15

## 2020-11-14 RX ORDER — SODIUM CHLORIDE 0.9 % (FLUSH) 0.9 %
10 SYRINGE (ML) INJECTION EVERY 12 HOURS SCHEDULED
Status: DISCONTINUED | OUTPATIENT
Start: 2020-11-14 | End: 2020-11-15

## 2020-11-14 RX ORDER — LIDOCAINE HYDROCHLORIDE 10 MG/ML
INJECTION, SOLUTION EPIDURAL; INFILTRATION; INTRACAUDAL; PERINEURAL
Status: DISCONTINUED
Start: 2020-11-14 | End: 2020-11-15

## 2020-11-14 RX ORDER — BUTORPHANOL TARTRATE 1 MG/ML
1 INJECTION, SOLUTION INTRAMUSCULAR; INTRAVENOUS
Status: DISCONTINUED | OUTPATIENT
Start: 2020-11-14 | End: 2020-11-15

## 2020-11-14 RX ORDER — SODIUM CHLORIDE, SODIUM LACTATE, POTASSIUM CHLORIDE, CALCIUM CHLORIDE 600; 310; 30; 20 MG/100ML; MG/100ML; MG/100ML; MG/100ML
INJECTION, SOLUTION INTRAVENOUS CONTINUOUS
Status: DISCONTINUED | OUTPATIENT
Start: 2020-11-14 | End: 2020-11-15

## 2020-11-14 RX ORDER — NALBUPHINE HCL 10 MG/ML
10 AMPUL (ML) INJECTION
Status: DISCONTINUED | OUTPATIENT
Start: 2020-11-14 | End: 2020-11-14

## 2020-11-14 RX ADMIN — BUTORPHANOL TARTRATE 1 MG: 1 INJECTION, SOLUTION INTRAMUSCULAR; INTRAVENOUS at 22:33

## 2020-11-14 RX ADMIN — Medication 25 MCG: at 18:23

## 2020-11-14 RX ADMIN — Medication 25 MCG: at 22:18

## 2020-11-14 RX ADMIN — SODIUM CHLORIDE, POTASSIUM CHLORIDE, SODIUM LACTATE AND CALCIUM CHLORIDE 125 ML/HR: 600; 310; 30; 20 INJECTION, SOLUTION INTRAVENOUS at 14:25

## 2020-11-14 RX ADMIN — SODIUM CHLORIDE, POTASSIUM CHLORIDE, SODIUM LACTATE AND CALCIUM CHLORIDE: 600; 310; 30; 20 INJECTION, SOLUTION INTRAVENOUS at 22:01

## 2020-11-14 RX ADMIN — Medication 25 MCG: at 14:27

## 2020-11-14 ASSESSMENT — PAIN DESCRIPTION - DESCRIPTORS: DESCRIPTORS: CRAMPING

## 2020-11-14 ASSESSMENT — PAIN SCALES - GENERAL
PAINLEVEL_OUTOF10: 7
PAINLEVEL_OUTOF10: 0

## 2020-11-14 NOTE — PROGRESS NOTES
Care of patient assumed at bedside. Patient is G 1/p0 here with c/o swelling of left side. Patient edc is 11/15/2020. Denies contractions at this time but had some last night. Vitals and assessment obtained.

## 2020-11-14 NOTE — PROGRESS NOTES
Dr Phillip Bhatia in to see patient. Decision to admit for cytotec induction mad. Patient ambulated to ld 11 and is up to bathroom. Plan of care discussed prior .

## 2020-11-14 NOTE — H&P
products    REVIEW OF SYSTEMS:    CONSTITUTIONAL:  positive for  fatigue and malaise  RESPIRATORY:  negative  CARDIOVASCULAR:  negative  GASTROINTESTINAL:  positive for abdominal pain  HEMATOLOGIC/LYMPHATIC:  negative  ALLERGIC/IMMUNOLOGIC:  negative  ENDOCRINE:  negative  MUSCULOSKELETAL:  negative  NEUROLOGICAL:  positive for headaches  BEHAVIOR/PSYCH:  negative    PHYSICAL EXAM:    General appearance: Patient appears diffusely swollen. Patient with generalized headache. Neurologic: Normal deep tendon reflexes  Lungs:  No increased work of breathing, good air exchange, clear to auscultation bilaterally, no crackles or wheezing  Heart:  Normal apical impulse, regular rate and rhythm, normal S1 and S2, no S3 or S4, and no murmur noted  Abdomen: Obese gravid soft nontender  Fetal heart rate:  Baseline Heart Rate 145, long term variability:  moderate    Cervix:    DILATION: Fingertip  EFFACEMENT:   50%  STATION:  -2 cm  CONSISTENCY:  soft  POSITION:  mid  BISHOPS SCORE:  5    Contraction frequency:  0 minutes  Membranes:  Intact    ASSESSMENT AND PLAN:    The patient is a 21 y.o.  1 parity 0 at 40 weeks  Gestational hypertension  Headache is generalized swelling  Intermittently elevated blood pressures    We will admit the patient and start induction using Cytotec.

## 2020-11-15 ENCOUNTER — ANESTHESIA (OUTPATIENT)
Dept: LABOR AND DELIVERY | Age: 25
End: 2020-11-15
Payer: COMMERCIAL

## 2020-11-15 ENCOUNTER — ANESTHESIA EVENT (OUTPATIENT)
Dept: LABOR AND DELIVERY | Age: 25
End: 2020-11-15
Payer: COMMERCIAL

## 2020-11-15 PROCEDURE — 0KQM0ZZ REPAIR PERINEUM MUSCLE, OPEN APPROACH: ICD-10-PCS | Performed by: OBSTETRICS & GYNECOLOGY

## 2020-11-15 PROCEDURE — 2500000003 HC RX 250 WO HCPCS

## 2020-11-15 PROCEDURE — 2580000003 HC RX 258: Performed by: OBSTETRICS & GYNECOLOGY

## 2020-11-15 PROCEDURE — 1220000001 HC SEMI PRIVATE L&D R&B

## 2020-11-15 PROCEDURE — 2500000003 HC RX 250 WO HCPCS: Performed by: ANESTHESIOLOGY

## 2020-11-15 PROCEDURE — 3700000025 EPIDURAL BLOCK: Performed by: ANESTHESIOLOGY

## 2020-11-15 PROCEDURE — 6360000002 HC RX W HCPCS: Performed by: OBSTETRICS & GYNECOLOGY

## 2020-11-15 PROCEDURE — 6370000000 HC RX 637 (ALT 250 FOR IP): Performed by: OBSTETRICS & GYNECOLOGY

## 2020-11-15 PROCEDURE — 7200000001 HC VAGINAL DELIVERY

## 2020-11-15 PROCEDURE — 6360000002 HC RX W HCPCS

## 2020-11-15 RX ORDER — ONDANSETRON 2 MG/ML
4 INJECTION INTRAMUSCULAR; INTRAVENOUS EVERY 6 HOURS PRN
Status: DISCONTINUED | OUTPATIENT
Start: 2020-11-15 | End: 2020-11-15

## 2020-11-15 RX ORDER — NALBUPHINE HCL 10 MG/ML
5 AMPUL (ML) INJECTION EVERY 4 HOURS PRN
Status: DISCONTINUED | OUTPATIENT
Start: 2020-11-15 | End: 2020-11-15

## 2020-11-15 RX ORDER — HYDROCODONE BITARTRATE AND ACETAMINOPHEN 5; 325 MG/1; MG/1
2 TABLET ORAL EVERY 4 HOURS PRN
Status: DISCONTINUED | OUTPATIENT
Start: 2020-11-15 | End: 2020-11-17 | Stop reason: HOSPADM

## 2020-11-15 RX ORDER — IBUPROFEN 800 MG/1
800 TABLET ORAL EVERY 8 HOURS PRN
Status: DISCONTINUED | OUTPATIENT
Start: 2020-11-15 | End: 2020-11-17 | Stop reason: HOSPADM

## 2020-11-15 RX ORDER — SODIUM CHLORIDE 0.9 % (FLUSH) 0.9 %
10 SYRINGE (ML) INJECTION EVERY 12 HOURS SCHEDULED
Status: DISCONTINUED | OUTPATIENT
Start: 2020-11-15 | End: 2020-11-17 | Stop reason: HOSPADM

## 2020-11-15 RX ORDER — MODIFIED LANOLIN
OINTMENT (GRAM) TOPICAL PRN
Status: DISCONTINUED | OUTPATIENT
Start: 2020-11-15 | End: 2020-11-17 | Stop reason: HOSPADM

## 2020-11-15 RX ORDER — DOCUSATE SODIUM 100 MG/1
100 CAPSULE, LIQUID FILLED ORAL 2 TIMES DAILY
Status: DISCONTINUED | OUTPATIENT
Start: 2020-11-15 | End: 2020-11-17 | Stop reason: HOSPADM

## 2020-11-15 RX ORDER — HYDROCODONE BITARTRATE AND ACETAMINOPHEN 5; 325 MG/1; MG/1
1 TABLET ORAL EVERY 4 HOURS PRN
Status: DISCONTINUED | OUTPATIENT
Start: 2020-11-15 | End: 2020-11-17 | Stop reason: HOSPADM

## 2020-11-15 RX ORDER — EPHEDRINE SULFATE 50 MG/ML
5 INJECTION, SOLUTION INTRAVENOUS PRN
Status: DISCONTINUED | OUTPATIENT
Start: 2020-11-15 | End: 2020-11-15

## 2020-11-15 RX ORDER — SODIUM CHLORIDE 0.9 % (FLUSH) 0.9 %
10 SYRINGE (ML) INJECTION PRN
Status: DISCONTINUED | OUTPATIENT
Start: 2020-11-15 | End: 2020-11-17 | Stop reason: HOSPADM

## 2020-11-15 RX ORDER — ACETAMINOPHEN 325 MG/1
650 TABLET ORAL EVERY 4 HOURS PRN
Status: DISCONTINUED | OUTPATIENT
Start: 2020-11-15 | End: 2020-11-17 | Stop reason: HOSPADM

## 2020-11-15 RX ORDER — NALOXONE HYDROCHLORIDE 0.4 MG/ML
0.4 INJECTION, SOLUTION INTRAMUSCULAR; INTRAVENOUS; SUBCUTANEOUS PRN
Status: DISCONTINUED | OUTPATIENT
Start: 2020-11-15 | End: 2020-11-15

## 2020-11-15 RX ADMIN — BUTORPHANOL TARTRATE 1 MG: 1 INJECTION, SOLUTION INTRAMUSCULAR; INTRAVENOUS at 10:25

## 2020-11-15 RX ADMIN — Medication 15 ML/HR: at 11:51

## 2020-11-15 RX ADMIN — BUTORPHANOL TARTRATE 1 MG: 1 INJECTION, SOLUTION INTRAMUSCULAR; INTRAVENOUS at 06:22

## 2020-11-15 RX ADMIN — LIDOCAINE HYDROCHLORIDE: 10 INJECTION, SOLUTION EPIDURAL; INFILTRATION; INTRACAUDAL; PERINEURAL at 16:55

## 2020-11-15 RX ADMIN — SODIUM CHLORIDE, POTASSIUM CHLORIDE, SODIUM LACTATE AND CALCIUM CHLORIDE 125 ML/HR: 600; 310; 30; 20 INJECTION, SOLUTION INTRAVENOUS at 10:30

## 2020-11-15 RX ADMIN — SODIUM CHLORIDE, PRESERVATIVE FREE 10 ML: 5 INJECTION INTRAVENOUS at 19:17

## 2020-11-15 RX ADMIN — Medication 15 ML: at 11:49

## 2020-11-15 RX ADMIN — Medication 25 MCG: at 10:19

## 2020-11-15 RX ADMIN — DOCUSATE SODIUM 100 MG: 100 CAPSULE ORAL at 20:51

## 2020-11-15 RX ADMIN — Medication 25 MCG: at 06:22

## 2020-11-15 RX ADMIN — Medication: at 18:10

## 2020-11-15 RX ADMIN — Medication 2 MILLI-UNITS/MIN: at 16:36

## 2020-11-15 RX ADMIN — Medication 500 ML: at 18:10

## 2020-11-15 RX ADMIN — Medication 25 MCG: at 02:03

## 2020-11-15 RX ADMIN — WITCH HAZEL 40 EACH: 500 SOLUTION RECTAL; TOPICAL at 18:10

## 2020-11-15 RX ADMIN — BUTORPHANOL TARTRATE 1 MG: 1 INJECTION, SOLUTION INTRAMUSCULAR; INTRAVENOUS at 02:03

## 2020-11-15 RX ADMIN — ONDANSETRON 4 MG: 2 INJECTION INTRAMUSCULAR; INTRAVENOUS at 05:49

## 2020-11-15 RX ADMIN — BENZOCAINE AND LEVOMENTHOL: 200; 5 SPRAY TOPICAL at 18:11

## 2020-11-15 RX ADMIN — IBUPROFEN 800 MG: 800 TABLET, FILM COATED ORAL at 18:10

## 2020-11-15 ASSESSMENT — PAIN SCALES - GENERAL
PAINLEVEL_OUTOF10: 7
PAINLEVEL_OUTOF10: 7
PAINLEVEL_OUTOF10: 9
PAINLEVEL_OUTOF10: 4
PAINLEVEL_OUTOF10: 4
PAINLEVEL_OUTOF10: 0

## 2020-11-15 ASSESSMENT — PAIN DESCRIPTION - ORIENTATION: ORIENTATION: LOWER

## 2020-11-15 ASSESSMENT — PAIN DESCRIPTION - PAIN TYPE: TYPE: ACUTE PAIN

## 2020-11-15 ASSESSMENT — PAIN DESCRIPTION - DESCRIPTORS: DESCRIPTORS: ACHING;CRAMPING

## 2020-11-15 ASSESSMENT — PAIN DESCRIPTION - LOCATION: LOCATION: ABDOMEN;BACK

## 2020-11-15 NOTE — PROGRESS NOTES
Remains up in chair. Sleeping between contractions but uncomfortable and restless with. Epidural option discussed again at this time and suggested prior to next cytotec placement at 1015. Patient undecided.

## 2020-11-15 NOTE — ANESTHESIA PRE PROCEDURE
Department of Anesthesiology  Preprocedure Note       Name:  Girma Maxewll   Age:  21 y.o.  :  1995                                          MRN:  [id number]         Date:  11/15/2020      Surgeon: Kita Dinh    Procedure: LABOR ANALGESIA    Medications prior to admission:   Prior to Admission medications    Medication Sig Start Date End Date Taking? Authorizing Provider   Prenatal Vit-Fe Fumarate-FA (PRENATAL VITAMIN) 27-1 MG TABS tablet Take 1 tablet by mouth daily   Yes Historical Provider, MD       Current medications:    Current Facility-Administered Medications   Medication Dose Route Frequency Provider Last Rate Last Dose    fentaNYL 1.85mcg/ml and Bupivicaine 0.1% in 0.9% NS 135ml infusion (OB) epidural  15 mL/hr Epidural Continuous Charan Barraza MD 15 mL/hr at 11/15/20 1151 15 mL/hr at 11/15/20 1151    naloxone (NARCAN) injection 0.4 mg  0.4 mg Intravenous PRN Charan Barraza MD        nalbuphine (NUBAIN) injection 5 mg  5 mg Intravenous Q4H PRN MD Shila Princedansetron [facility]) injection 4 mg  4 mg Intravenous Q6H PRN Charan Barraza MD        ePHEDrine injection 5 mg  5 mg Intravenous PRN Charan Barraza MD        lactated ringers infusion   Intravenous Continuous Lamont Echavarria  mL/hr at 11/15/20 1115 999 mL/hr at 11/15/20 1115    sodium chloride flush 0.9 % injection 10 mL  10 mL Intravenous 2 times per day Lamont Echavarria MD        sodium chloride flush 0.9 % injection 10 mL  10 mL Intravenous PRN Lamont Echavarria MD        ondansetron [facility]) injection 4 mg  4 mg Intravenous Q6H PRN Lamont Echaavrria MD   4 mg at 11/15/20 0549    miSOPROStol (CYTOTEC) pre-split tablet TABS 25 mcg  25 mcg Vaginal Q4H Lamont Echavarria MD   25 mcg at 11/15/20 1019    butorphanol (STADOL) injection 1 mg  1 mg Intravenous Q3H PRN Lamont Echavarria MD   1 mg at 11/15/20 1025       Allergies:     Allergies   Allergen Reactions    Banana Itching    Nickel  Shellfish-Derived Products Swelling       Problem List:    Patient Active Problem List   Diagnosis Code    Symptomatic cholelithiasis K80.20    Class 3 severe obesity due to excess calories without serious comorbidity with body mass index (BMI) of 40.0 to 44.9 in adult ([facility] E66.01, Z68.41    Gastroesophageal reflux disease K21.9    Essential hypertension I10    Abdominal pain affecting pregnancy, antepartum O26.899, R10.9    Pregnancy with 39 completed weeks gestation Z3A.36    Transient hypertension of pregnancy in third trimester F78.7    Complication of pregnancy in third trimester O26.93    Pregnancy with 45 completed weeks gestation Z3A.38    Hypertension affecting pregnancy in third trimester O16.3       Past Medical History:        Diagnosis Date    ADHD (attention deficit hyperactivity disorder)     DR Myah Mendiola    Hypertension     MDRO (multiple drug resistant organisms) resistance     Obesity     Thyromegaly        Past Surgical History:        Procedure Laterality Date    CHOLECYSTECTOMY, LAPAROSCOPIC  05/03/2017       Social History:    Social History     Tobacco Use    Smoking status: Former Smoker    Smokeless tobacco: Never Used    Tobacco comment: LESS THAN A PPD   Substance Use Topics    Alcohol use: Not Currently     Comment: socially                                Counseling given: Not Answered  Comment: LESS THAN A PPD      Vital Signs (Current):   Vitals:    11/15/20 1154 11/15/20 1156 11/15/20 1158 11/15/20 1200   BP: 124/63 (!) 101/55 (!) 103/56 131/68   Pulse: 97 98 82 95   Resp:       Temp:       TempSrc:       Weight:       Height:                                                  BP Readings from Last 3 Encounters:   11/15/20 131/68   11/03/20 124/69   10/28/20 118/64       NPO Status:                                                                                 BMI:   Wt Readings from Last 3 Encounters:   11/14/20 285 lb (129.3 kg)   08/27/20 285 lb (129.3 kg) 05/19/20 275 lb (124.7 kg)     Body mass index is 48.92 kg/m². CBC:   Lab Results   Component Value Date    WBC 12.5 11/14/2020    RBC 5.09 11/14/2020    HGB 10.2 11/14/2020    HCT 33.9 11/14/2020    MCV 66.6 11/14/2020    RDW 20.7 11/14/2020     11/14/2020       CMP:   Lab Results   Component Value Date     11/14/2020    K 4.3 11/14/2020    K 4.3 05/19/2020     11/14/2020    CO2 22 11/14/2020    BUN 6 11/14/2020    CREATININE 0.6 11/14/2020    GFRAA >60 11/14/2020    LABGLOM >60 11/14/2020    GLUCOSE 79 11/14/2020    PROT 7.2 11/14/2020    CALCIUM 9.4 11/14/2020    BILITOT <0.2 11/14/2020    ALKPHOS 190 11/14/2020    AST 18 11/14/2020    ALT 9 11/14/2020       POC Tests: No results for input(s): POCGLU, POCNA, POCK, POCCL, POCBUN, POCHEMO, POCHCT in the last 72 hours.     Coags: No results found for: PROTIME, INR, APTT    HCG (If Applicable):   Lab Results   Component Value Date    PREGTESTUR NEGATIVE 06/10/2017        ABGs: No results found for: PHART, PO2ART, LBO6FMI, QDP7TTO, BEART, H9QPRZTN     Type & Screen (If Applicable):  No results found for: LABABO, LABRH    Drug/Infectious Status (If Applicable):  No results found for: HIV, HEPCAB    COVID-19 Screening (If Applicable): No results found for: COVID19      Anesthesia Evaluation  Patient summary reviewed no history of anesthetic complications:   Airway: Mallampati: II  TM distance: >3 FB   Neck ROM: full  Mouth opening: > = 3 FB Dental: normal exam         Pulmonary:Negative Pulmonary ROS breath sounds clear to auscultation                             Cardiovascular:  Exercise tolerance: good (>4 METS),   (+) hypertension:,         Rhythm: regular  Rate: normal                    Neuro/Psych:   (+) psychiatric history: stable with treatment            GI/Hepatic/Renal:   (+) GERD: no interval change, morbid obesity          Endo/Other:                     Abdominal:   (+) obese,         Vascular: Anesthesia Plan      epidural     ASA 3             Anesthetic plan and risks discussed with patient. Use of blood products discussed with patient whom consented to blood products.                    Shannon Betancur MD   11/15/2020

## 2020-11-15 NOTE — ANESTHESIA PROCEDURE NOTES
Epidural Block    Patient location during procedure: OB  Reason for block: labor epidural  Staffing  Anesthesiologist: Kishore Escobar MD  Preanesthetic Checklist  Completed: patient identified, site marked, surgical consent, pre-op evaluation, timeout performed, IV checked, risks and benefits discussed, monitors and equipment checked, anesthesia consent given, oxygen available and patient being monitored  Epidural  Patient position: sitting  Prep: ChloraPrep  Patient monitoring: cardiac monitor, continuous pulse ox and frequent blood pressure checks  Approach: midline  Location: lumbar (1-5)  Injection technique: THA saline  Provider prep: mask and sterile gloves  Needle  Needle type: Tuohy   Needle gauge: 18 G  Needle length: 3.5 in  Catheter type: end hole  Catheter size: 20 G.   Test dose: negative  Assessment  Sensory level: T8  Hemodynamics: stable  Attempts: 1

## 2020-11-15 NOTE — PROGRESS NOTES
Dr Abby Clifford updated on patient status, fetal tracing, and vaginal exam. No new orders received at this time.

## 2020-11-15 NOTE — PROGRESS NOTES
Dr Jcarlos Samuel in room for epidural placement. Patient set up on side of bed. Procedure started at [phone]. Catheter placed at 1140. repositioned to lying at 1143. Test dose at 1146. Bolus at 1149. pump initiated at 15 cc/hr. Procedure tolerated well by patient.

## 2020-11-15 NOTE — PROGRESS NOTES
Patient returned from bathroom and placed in bed for 6th dose cytotec. Patient states felt a pop. Srom, meconium fluid noted at this time. 6th dose placed at 1019. Patient insructed on low fowlrs position and that she is not to get out of bed. Epidural discussed again but patient is requesting iv med.

## 2020-11-15 NOTE — PROGRESS NOTES
Care assumed at bedside. Patient sitting in bedside chair. fhts monitord with JACOBY.  at side. Patient states she's medicated, feeling some relief but still hurting and this is only position that helps. POC discussed. cytotec continues every 4 hours. Provided ice/popsicles. No other needs at this time. Patient's mom on continuous \"live facetime\" screen observing.

## 2020-11-15 NOTE — OP NOTE
VAGINAL DELIVERY NOTE    PRE OP DX:   Term pregnancy, induction of labor    POST OP DX:  Same plus delivery of live male infant    PROCEDURE:      ANESTHESIA:  Epidural and 1% lidocaine for repair    PLACENTA:  3VC spontaneous intact    MEDS:  None    COMPLICATIONS:  None    NUCHAL CORD:  None    SHOULDER DYSTOCIA:  None    EBL:  < 500 cc    LACERATIONS:  2nd degree vaginal, repaired with 3-0 Vicryl    SPECIMENS:  None    BABY:  Live male infant 7# 9 oz with MSAF and good Apgars    Mom and baby to usual care    Antwon George  11/15/2020

## 2020-11-15 NOTE — PROGRESS NOTES
Department of Obstetrics and Gynecology  Labor and Delivery   Attending Progress Note      SUBJECTIVE:  Pushing    OBJECTIVE:      Vitals:    BP (!) 127/92   Pulse 118   Temp 98.3 °F (36.8 °C) (Oral)   Resp 18   Ht 5' 4\" (1.626 m)   Wt 285 lb (129.3 kg)   LMP 02/12/2020   BMI 48.92 kg/m²     Fetal heart rate:       Baseline Heart Rate:  145        Accelerations:  present       Long Term Variability:  moderate         Contraction frequency: 4 minutes    Cervix:           Dilation:  Complete         Station:  +4           ASSESSMENT & PLAN:    Continue pushing    Alex Edis  11/15/2020

## 2020-11-16 PROCEDURE — 1220000001 HC SEMI PRIVATE L&D R&B

## 2020-11-16 PROCEDURE — 6370000000 HC RX 637 (ALT 250 FOR IP): Performed by: OBSTETRICS & GYNECOLOGY

## 2020-11-16 RX ADMIN — HYDROCODONE BITARTRATE AND ACETAMINOPHEN 1 TABLET: 5; 325 TABLET ORAL at 08:24

## 2020-11-16 RX ADMIN — IBUPROFEN 800 MG: 800 TABLET, FILM COATED ORAL at 14:20

## 2020-11-16 RX ADMIN — DOCUSATE SODIUM 100 MG: 100 CAPSULE ORAL at 08:23

## 2020-11-16 RX ADMIN — IBUPROFEN 800 MG: 800 TABLET, FILM COATED ORAL at 23:52

## 2020-11-16 RX ADMIN — IBUPROFEN 800 MG: 800 TABLET, FILM COATED ORAL at 04:57

## 2020-11-16 RX ADMIN — DOCUSATE SODIUM 100 MG: 100 CAPSULE ORAL at 20:28

## 2020-11-16 ASSESSMENT — PAIN SCALES - GENERAL
PAINLEVEL_OUTOF10: 6
PAINLEVEL_OUTOF10: 3
PAINLEVEL_OUTOF10: 0
PAINLEVEL_OUTOF10: 3
PAINLEVEL_OUTOF10: 4

## 2020-11-16 ASSESSMENT — PAIN DESCRIPTION - ORIENTATION: ORIENTATION: LOWER

## 2020-11-16 ASSESSMENT — PAIN DESCRIPTION - DESCRIPTORS
DESCRIPTORS: CONSTANT;DISCOMFORT
DESCRIPTORS: CRAMPING

## 2020-11-16 ASSESSMENT — PAIN DESCRIPTION - FREQUENCY: FREQUENCY: INTERMITTENT

## 2020-11-16 ASSESSMENT — PAIN DESCRIPTION - LOCATION: LOCATION: PERINEUM

## 2020-11-16 ASSESSMENT — PAIN DESCRIPTION - PAIN TYPE: TYPE: ACUTE PAIN

## 2020-11-16 NOTE — PLAN OF CARE
Problem: Sensory:  Goal: Ability to identify pain intensity on a pain scale and rate it consistently will improve  Description: Ability to identify pain intensity on a pain scale and rate it consistently will improve  Outcome: Completed  Goal: Ability to maintain vital signs within normal range will improve  Description: Ability to maintain vital signs within normal range will improve  Outcome: Completed  Goal: Pain level will decrease  Description: Pain level will decrease  Outcome: Completed     Problem: Anxiety:  Goal: Level of anxiety will decrease  Description: Level of anxiety will decrease  Outcome: Completed     Problem: Breathing Pattern - Ineffective:  Goal: Able to breathe comfortably  Description: Able to breathe comfortably  Outcome: Completed     Problem: Fluid Volume - Imbalance:  Goal: Absence of imbalanced fluid volume signs and symptoms  Description: Absence of imbalanced fluid volume signs and symptoms  Outcome: Completed     Problem: Infection - Intrapartum Infection:  Goal: Will show no infection signs and symptoms  Description: Will show no infection signs and symptoms  Outcome: Completed     Problem: Labor Process - Prolonged:  Goal: Labor progression, first stage, within specified pattern  Description: Labor progression, first stage, within specified pattern  Outcome: Completed     Problem:  Screening:  Goal: Ability to make informed decisions regarding treatment has improved  Description: Ability to make informed decisions regarding treatment has improved  Outcome: Completed     Problem: Pain - Acute:  Goal: Pain level will decrease  Description: Pain level will decrease  Outcome: Completed     Problem: Tissue Perfusion - Uteroplacental, Altered:  Goal: Absence of abnormal fetal heart rate pattern  Description: Absence of abnormal fetal heart rate pattern  Outcome: Completed     Problem: Urinary Retention:  Goal: Experiences of bladder distention will decrease  Description: Experiences of bladder distention will decrease  Outcome: Completed     Problem: Pain:  Goal: Pain level will decrease  Description: Pain level will decrease  Outcome: Completed  Goal: Control of acute pain  Description: Control of acute pain  Outcome: Completed  Goal: Control of chronic pain  Description: Control of chronic pain  Outcome: Completed

## 2020-11-16 NOTE — PROGRESS NOTES
Assumed care of patient at this time, report given from previous RN. POC and safe sleep practices reviewed with patient, patient verbalizes understanding. Comfort needs met, call light within reach.

## 2020-11-16 NOTE — PROGRESS NOTES
Hearing screening results were discussed with parent. Questions answered. Brochure given to parent. Advised to monitor developmental milestones and contact physician for any concerns.    Lois Rogel

## 2020-11-16 NOTE — PLAN OF CARE
Problem: Mood - Altered:  Goal: Mood stable  Description: Mood stable  Outcome: Met This Shift     Problem: Discharge Planning:  Goal: Discharged to appropriate level of care  Description: Discharged to appropriate level of care  Outcome: Met This Shift

## 2020-11-16 NOTE — PROGRESS NOTES
Recovery completed. OOB to B/R , stand by assist. Voided, debbie care instructions given. Pt c/o ringing in her ears. Returned to bed. Will shower later. Instructed to call for assist for OOB.  supportive.

## 2020-11-16 NOTE — ANESTHESIA POSTPROCEDURE EVALUATION
Department of Anesthesiology  Postprocedure Note    Patient: Pat Christie  MRN: [id number]  YOB: 1995  Date of evaluation: 11/16/2020  Time:  8:20 AM     Procedure Summary     Date:  11/15/20 Room / Location:      Anesthesia Start:  1130 Anesthesia Stop:  8815    Procedure:  Labor Analgesia Diagnosis:      Scheduled Providers:   Responsible Provider:  Shawanda Westbrook MD    Anesthesia Type:  epidural ASA Status:  3          Anesthesia Type: No value filed. Raman Phase I:      Raman Phase II:      Last vitals: Reviewed and per EMR flowsheets. Anesthesia Post Evaluation     Postoperative Anesthesia Note    Name:    Pat Christie  MRN:      [id number]    Patient Vitals for the past 10 hrs:   BP Temp Temp src Pulse Resp   11/16/20 0726 (!) 105/50 98.3 °F (36.8 °C) Oral 79 17   11/15/20 2345 109/61 98.1 °F (36.7 °C) Oral 100 18        LABS:    CBC  Lab Results   Component Value Date/Time    WBC 12.5 (H) 11/14/2020 02:15 PM    HGB 10.2 (L) 11/14/2020 02:15 PM    HCT 33.9 (L) 11/14/2020 02:15 PM     11/14/2020 02:15 PM     RENAL  Lab Results   Component Value Date/Time     11/14/2020 02:20 PM    K 4.3 11/14/2020 02:20 PM    K 4.3 05/19/2020 02:10 AM     11/14/2020 02:20 PM    CO2 22 11/14/2020 02:20 PM    BUN 6 11/14/2020 02:20 PM    CREATININE 0.6 11/14/2020 02:20 PM    GLUCOSE 79 11/14/2020 02:20 PM     COAGS  No results found for: PROTIME, INR, APTT    Intake & Output:   In: 1600 [I.V.:1600]  Out: 150 [Urine:150]     Nausea & Vomiting:  No    Level of Consciousness:  Awake    Pain Assessment:  Adequate analgesia    Anesthesia Complications:  No apparent anesthetic complications from neuraxial anesthetic/opiod    SUMMARY      Vital signs stable

## 2020-11-17 VITALS
DIASTOLIC BLOOD PRESSURE: 64 MMHG | RESPIRATION RATE: 16 BRPM | OXYGEN SATURATION: 99 % | WEIGHT: 285 LBS | BODY MASS INDEX: 48.65 KG/M2 | HEIGHT: 64 IN | TEMPERATURE: 98.6 F | SYSTOLIC BLOOD PRESSURE: 118 MMHG | HEART RATE: 96 BPM

## 2020-11-17 PROBLEM — Z3A.36 PREGNANCY WITH 36 COMPLETED WEEKS GESTATION: Status: RESOLVED | Noted: 2020-10-20 | Resolved: 2020-11-17

## 2020-11-17 PROBLEM — R10.9 ABDOMINAL PAIN AFFECTING PREGNANCY, ANTEPARTUM: Status: RESOLVED | Noted: 2020-08-27 | Resolved: 2020-11-17

## 2020-11-17 PROBLEM — O16.3 HYPERTENSION AFFECTING PREGNANCY IN THIRD TRIMESTER: Status: RESOLVED | Noted: 2020-11-14 | Resolved: 2020-11-17

## 2020-11-17 PROBLEM — K80.20 SYMPTOMATIC CHOLELITHIASIS: Status: RESOLVED | Noted: 2017-04-15 | Resolved: 2020-11-17

## 2020-11-17 PROBLEM — O26.899 ABDOMINAL PAIN AFFECTING PREGNANCY, ANTEPARTUM: Status: RESOLVED | Noted: 2020-08-27 | Resolved: 2020-11-17

## 2020-11-17 PROBLEM — O26.93 COMPLICATION OF PREGNANCY IN THIRD TRIMESTER: Status: RESOLVED | Noted: 2020-11-03 | Resolved: 2020-11-17

## 2020-11-17 PROCEDURE — 90715 TDAP VACCINE 7 YRS/> IM: CPT | Performed by: OBSTETRICS & GYNECOLOGY

## 2020-11-17 PROCEDURE — 6370000000 HC RX 637 (ALT 250 FOR IP): Performed by: OBSTETRICS & GYNECOLOGY

## 2020-11-17 PROCEDURE — 90471 IMMUNIZATION ADMIN: CPT | Performed by: OBSTETRICS & GYNECOLOGY

## 2020-11-17 PROCEDURE — 6360000002 HC RX W HCPCS: Performed by: OBSTETRICS & GYNECOLOGY

## 2020-11-17 RX ORDER — FERROUS SULFATE 325(65) MG
325 TABLET ORAL 2 TIMES DAILY WITH MEALS
Status: DISCONTINUED | OUTPATIENT
Start: 2020-11-17 | End: 2020-11-17 | Stop reason: HOSPADM

## 2020-11-17 RX ORDER — IBUPROFEN 800 MG/1
800 TABLET ORAL EVERY 8 HOURS PRN
Qty: 50 TABLET | Refills: 1 | Status: SHIPPED | OUTPATIENT
Start: 2020-11-17

## 2020-11-17 RX ADMIN — HYDROCODONE BITARTRATE AND ACETAMINOPHEN 1 TABLET: 5; 325 TABLET ORAL at 03:21

## 2020-11-17 RX ADMIN — TETANUS TOXOID, REDUCED DIPHTHERIA TOXOID AND ACELLULAR PERTUSSIS VACCINE, ADSORBED 0.5 ML: 5; 2.5; 8; 8; 2.5 SUSPENSION INTRAMUSCULAR at 13:27

## 2020-11-17 RX ADMIN — FERROUS SULFATE TAB 325 MG (65 MG ELEMENTAL FE) 325 MG: 325 (65 FE) TAB at 09:16

## 2020-11-17 RX ADMIN — WITCH HAZEL 40 EACH: 500 SOLUTION RECTAL; TOPICAL at 13:43

## 2020-11-17 RX ADMIN — DOCUSATE SODIUM 100 MG: 100 CAPSULE ORAL at 08:32

## 2020-11-17 ASSESSMENT — PAIN DESCRIPTION - PAIN TYPE: TYPE: ACUTE PAIN

## 2020-11-17 ASSESSMENT — PAIN DESCRIPTION - FREQUENCY: FREQUENCY: INTERMITTENT

## 2020-11-17 ASSESSMENT — PAIN DESCRIPTION - LOCATION: LOCATION: PERINEUM

## 2020-11-17 ASSESSMENT — PAIN DESCRIPTION - DESCRIPTORS: DESCRIPTORS: CONSTANT;DISCOMFORT

## 2020-11-17 ASSESSMENT — PAIN DESCRIPTION - ORIENTATION: ORIENTATION: LOWER;MID

## 2020-11-17 ASSESSMENT — PAIN SCALES - GENERAL
PAINLEVEL_OUTOF10: 4
PAINLEVEL_OUTOF10: 0
PAINLEVEL_OUTOF10: 0

## 2020-11-17 NOTE — DISCHARGE SUMMARY
Obstetric Discharge Summary    Admitting Diagnosis  IUP 40 weeks  OB History        1    Para   1    Term   1            AB        Living   1       SAB        TAB        Ectopic        Molar        Multiple   0    Live Births   1                Reasons for Admission on 2020 12:00 PM  Hypertension affecting pregnancy in third trimester [O16.3]  No comment available  Induction of Labor    Prenatal Procedures  Ultrasound x 3  Intrapartum Procedures        Multiple birth?: no        Spontaneous Vaginal Delivery: See Labor and Delivery Summary       Postpartum Procedures  None    Postpartum/Operative Complications       [place] Data  Information for the patient's :  Erika Hernandez [[id number]]   male   Birth Weight: 7 lb 9 oz (3.43 kg)     Discharge With Mother  Complications: No    Discharge Diagnosis       Discharge Information  Current Discharge Medication List      CONTINUE these medications which have NOT CHANGED    Details   Prenatal Vit-Fe Fumarate-FA (PRENATAL VITAMIN) 27-1 MG TABS tablet Take 1 tablet by mouth daily             No discharge procedures on file.     Discharged Condition: good    Discharge to: Home  Follow up in 6 weeks at [facility]

## 2020-11-17 NOTE — PROGRESS NOTES
CLINICAL PHARMACY NOTE: MEDS TO [address] Select Patient?: No  Total # of Prescriptions Filled: 1   The following medications were delivered to the patient:              Ibuprofen 800 mg  Total # of Interventions Completed: 3  Time Spent (min): 15    Additional Documentation:

## 2020-11-17 NOTE — PLAN OF CARE
Lactation Consultant phone number given to patient for any post-discharge questions or concerns and Labor & Delivery phone number given if Lactation Consultant not available.   Merrick Gonsales ,EDELMIRAN, RN, IBCLC, CCCE

## 2020-11-17 NOTE — PROGRESS NOTES
Subjective:     Postpartum Day 1: Vaginal delivery    The patient feels well. The patient denies emotional concerns. Pain is well controlled with current medications. The baby is well. Objective:      No data found. General:    alert, appears stated age and cooperative   Bowel Sounds:  active   Lochia:  appropriate   Uterine Fundus:   firm       DVT Evaluation:  No evidence of DVT seen on physical exam.  Negative Miguel's sign. No cords or calf tenderness. Assessment:     1. Post partum day 1 . Doing well postoperatively. Doing well. Plan:     Continue current care.

## 2020-11-20 ENCOUNTER — APPOINTMENT (OUTPATIENT)
Dept: ULTRASOUND IMAGING | Age: 25
End: 2020-11-20
Payer: COMMERCIAL

## 2020-11-20 ENCOUNTER — HOSPITAL ENCOUNTER (EMERGENCY)
Age: 25
Discharge: HOME | End: 2020-11-20
Payer: COMMERCIAL

## 2020-11-20 VITALS
TEMPERATURE: 98 F | RESPIRATION RATE: 18 BRPM | DIASTOLIC BLOOD PRESSURE: 90 MMHG | OXYGEN SATURATION: 99 % | SYSTOLIC BLOOD PRESSURE: 150 MMHG | BODY MASS INDEX: 48.65 KG/M2 | HEIGHT: 64 IN | WEIGHT: 285 LBS

## 2020-11-20 PROCEDURE — 99282 EMERGENCY DEPT VISIT SF MDM: CPT

## 2020-11-20 PROCEDURE — 93971 EXTREMITY STUDY: CPT

## 2020-11-20 RX ORDER — FERROUS SULFATE 325(65) MG
325 TABLET ORAL
COMMUNITY

## 2020-11-20 RX ORDER — DOCUSATE SODIUM 100 MG/1
100 CAPSULE, LIQUID FILLED ORAL 2 TIMES DAILY
COMMUNITY

## 2020-11-20 ASSESSMENT — PAIN DESCRIPTION - FREQUENCY: FREQUENCY: CONTINUOUS

## 2020-11-20 ASSESSMENT — PAIN DESCRIPTION - ORIENTATION: ORIENTATION: RIGHT

## 2020-11-20 ASSESSMENT — PAIN SCALES - GENERAL: PAINLEVEL_OUTOF10: 3

## 2020-11-20 ASSESSMENT — PAIN DESCRIPTION - LOCATION: LOCATION: LEG

## 2020-11-20 NOTE — ED PROVIDER NOTES
[facility]    Department of Emergency Medicine   ED  Provider Note  Admit Date/RoomTime: 11/20/2020  9:49 AM  ED Room: 25/25  Chief Complaint   Leg Pain (posterior onset this am-denies injury gave birth 2 days ago)    History of Present Illness   Source of history provided by:  patient. History/Exam Limitations: none. Jovani Joyce is a 21 y.o. old female who presents to the emergency department by private vehicle, for right calf pain which she first noticed after she woke up this morning prior to arrival.  Cause of complaint: Unknown, patient gave birth 2 days ago and was told to come in to be ruled out DVT. Her weight bearing status is normal.  Previous injury: no.  Since onset the symptoms have been moderate in degree. Her pain is aggraveated by any movement or pressure on or palpation and relieved by nothing. ROS    Pertinent positives and negatives are stated within HPI, all other systems reviewed and are negative. Past Medical History:   Diagnosis Date    ADHD (attention deficit hyperactivity disorder)     DR Zheng Leiva    Hypertension     MDRO (multiple drug resistant organisms) resistance     Obesity     Thyromegaly      Past Surgical History:   Procedure Laterality Date    CHOLECYSTECTOMY, LAPAROSCOPIC  05/03/2017     Social History:  reports that she has quit smoking. She has never used smokeless tobacco. She reports previous alcohol use. She reports that she does not use drugs. Family History: family history includes Coronary Art Dis in her father; Hypertension in her mother. Allergies: Banana;  Nickel; and Shellfish-derived products    Physical Exam         ED Triage Vitals   BP Temp Temp Source Pulse Resp SpO2 Height Weight   11/20/20 0944 11/20/20 0924 11/20/20 0924 -- 11/20/20 0944 11/20/20 0944 11/20/20 0944 11/20/20 0944   (!) 150/90 98 °F (36.7 °C) Tympanic  18 99 % 5' 4\" (1.626 m) 285 lb (129.3 kg)      Oxygen Saturation Interpretation: Normal.    · Constitutional: Alert, development consistent with age. · HEENT:  NC/NT. Airway patent. · Neck:  Normal ROM. Supple. · Extremity(s):  Right: calf. Tenderness:  mild. Swelling: None. Calf:  Negative Miguel's sign. No significant calf/ankle edema. Posterior calf tenderness present. .              Deformity: No.                 ROM: full range with pain. Skin:  no erythema, rash or wounds noted. Neurovascular: Motor deficit: none. Sensory deficit: none. Pulse deficit: none. Capillary refill: normal.  · Gait:  normal.  · Lymphatics: No lymphangitis or adenopathy noted. · Neurological:  Oriented x3. Motor functions intact. Lab / Imaging Results   (All laboratory and radiology results have been personally reviewed by myself)  Labs:  No results found for this visit on 11/20/20. Imaging: All Radiology results interpreted by Radiologist unless otherwise noted. US DUP LOWER EXTREMITY RIGHT MEY   Final Result   No sonographic evidence of DVT in the right lower extremity. ED Course / Medical Decision Making   Medications - No data to display     Consults:   None    Procedure(s):   none    MDM:     Patient presents to the emergency department for right calf pain. Ultrasound was obtained and unremarkable for DVT. Follow-up with OB/GYN as needed. Specific conditions for emergent return have been discussed and the patient verbalized understanding to return immediately for new or worsening symptoms. RICE therapy. Counseling:   I discussed today's results with the patient in addition to providing specific details for the plan of care and counseling regarding the diagnosis and prognosis. Questions are answered at this time and they are agreeable with the plan. Assessment      1.  Right leg pain      Plan   Discharge to home  Patient condition is good    New Medications     New Prescriptions    No medications on file     Electronically signed by Christos Tanner   DD: 11/20/20  **This report was transcribed using voice recognition software. Every effort was made to ensure accuracy; however, inadvertent computerized transcription errors may be present.   END OF ED PROVIDER NOTE        Malachi Wade PA-C  11/20/20 1140

## 2021-11-07 ENCOUNTER — HOSPITAL ENCOUNTER (EMERGENCY)
Age: 26
Discharge: LEFT BEFORE DISCHARGE | End: 2021-11-08
Payer: COMMERCIAL

## 2021-11-07 VITALS
HEIGHT: 64 IN | OXYGEN SATURATION: 96 % | RESPIRATION RATE: 17 BRPM | TEMPERATURE: 98.7 F | DIASTOLIC BLOOD PRESSURE: 109 MMHG | WEIGHT: 255 LBS | SYSTOLIC BLOOD PRESSURE: 169 MMHG | HEART RATE: 113 BPM | BODY MASS INDEX: 43.54 KG/M2

## 2021-11-07 LAB
ALBUMIN SERPL-MCNC: 4.3 G/DL (ref 3.5–5.2)
ALP BLD-CCNC: 89 U/L (ref 35–104)
ALT SERPL-CCNC: 13 U/L (ref 0–32)
ANION GAP SERPL CALCULATED.3IONS-SCNC: 10 MMOL/L (ref 7–16)
AST SERPL-CCNC: 18 U/L (ref 0–31)
BASOPHILS ABSOLUTE: 0.06 E9/L (ref 0–0.2)
BASOPHILS RELATIVE PERCENT: 0.4 % (ref 0–2)
BILIRUB SERPL-MCNC: 0.2 MG/DL (ref 0–1.2)
BILIRUBIN URINE: NEGATIVE
BLOOD, URINE: NEGATIVE
BUN BLDV-MCNC: 10 MG/DL (ref 6–20)
CALCIUM SERPL-MCNC: 9.3 MG/DL (ref 8.6–10.2)
CHLORIDE BLD-SCNC: 101 MMOL/L (ref 98–107)
CLARITY: CLEAR
CO2: 26 MMOL/L (ref 22–29)
COLOR: YELLOW
CREAT SERPL-MCNC: 0.9 MG/DL (ref 0.5–1)
EOSINOPHILS ABSOLUTE: 0.23 E9/L (ref 0.05–0.5)
EOSINOPHILS RELATIVE PERCENT: 1.6 % (ref 0–6)
GFR AFRICAN AMERICAN: >60
GFR NON-AFRICAN AMERICAN: >60 ML/MIN/1.73
GLUCOSE BLD-MCNC: 90 MG/DL (ref 74–99)
GLUCOSE URINE: NEGATIVE MG/DL
HCG, URINE, POC: NEGATIVE
HCT VFR BLD CALC: 35.5 % (ref 34–48)
HEMOGLOBIN: 10.8 G/DL (ref 11.5–15.5)
IMMATURE GRANULOCYTES #: 0.03 E9/L
IMMATURE GRANULOCYTES %: 0.2 % (ref 0–5)
KETONES, URINE: NEGATIVE MG/DL
LACTIC ACID: 0.7 MMOL/L (ref 0.5–2.2)
LEUKOCYTE ESTERASE, URINE: NEGATIVE
LIPASE: 21 U/L (ref 13–60)
LYMPHOCYTES ABSOLUTE: 5.08 E9/L (ref 1.5–4)
LYMPHOCYTES RELATIVE PERCENT: 34.3 % (ref 20–42)
Lab: NORMAL
MCH RBC QN AUTO: 20.1 PG (ref 26–35)
MCHC RBC AUTO-ENTMCNC: 30.4 % (ref 32–34.5)
MCV RBC AUTO: 66 FL (ref 80–99.9)
MONOCYTES ABSOLUTE: 0.75 E9/L (ref 0.1–0.95)
MONOCYTES RELATIVE PERCENT: 5.1 % (ref 2–12)
NEGATIVE QC PASS/FAIL: NORMAL
NEUTROPHILS ABSOLUTE: 8.68 E9/L (ref 1.8–7.3)
NEUTROPHILS RELATIVE PERCENT: 58.4 % (ref 43–80)
NITRITE, URINE: NEGATIVE
PDW BLD-RTO: 16.7 FL (ref 11.5–15)
PH UA: 6.5 (ref 5–9)
PLATELET # BLD: 298 E9/L (ref 130–450)
PMV BLD AUTO: 10.7 FL (ref 7–12)
POSITIVE QC PASS/FAIL: NORMAL
POTASSIUM SERPL-SCNC: 3.6 MMOL/L (ref 3.5–5)
PROTEIN UA: NEGATIVE MG/DL
RBC # BLD: 5.38 E12/L (ref 3.5–5.5)
SODIUM BLD-SCNC: 137 MMOL/L (ref 132–146)
SPECIFIC GRAVITY UA: >=1.03 (ref 1–1.03)
TOTAL PROTEIN: 7.4 G/DL (ref 6.4–8.3)
UROBILINOGEN, URINE: 0.2 E.U./DL
WBC # BLD: 14.8 E9/L (ref 4.5–11.5)

## 2021-11-07 PROCEDURE — 80053 COMPREHEN METABOLIC PANEL: CPT

## 2021-11-07 PROCEDURE — 83605 ASSAY OF LACTIC ACID: CPT

## 2021-11-07 PROCEDURE — 99283 EMERGENCY DEPT VISIT LOW MDM: CPT

## 2021-11-07 PROCEDURE — 83690 ASSAY OF LIPASE: CPT

## 2021-11-07 PROCEDURE — 36415 COLL VENOUS BLD VENIPUNCTURE: CPT

## 2021-11-07 PROCEDURE — 85025 COMPLETE CBC W/AUTO DIFF WBC: CPT

## 2021-11-07 PROCEDURE — 81003 URINALYSIS AUTO W/O SCOPE: CPT

## 2021-11-07 ASSESSMENT — PAIN SCALES - GENERAL: PAINLEVEL_OUTOF10: 5

## 2021-11-07 ASSESSMENT — PAIN DESCRIPTION - PAIN TYPE: TYPE: ACUTE PAIN

## 2021-11-07 ASSESSMENT — PAIN DESCRIPTION - ORIENTATION: ORIENTATION: LOWER

## 2021-11-07 ASSESSMENT — PAIN DESCRIPTION - ONSET: ONSET: ON-GOING

## 2021-11-07 ASSESSMENT — PAIN DESCRIPTION - LOCATION: LOCATION: ABDOMEN;PELVIS

## 2021-11-07 ASSESSMENT — PAIN DESCRIPTION - FREQUENCY: FREQUENCY: CONTINUOUS

## 2021-11-08 NOTE — ED NOTES
FIRST PROVIDER CONTACT ASSESSMENT NOTE           Department of Emergency Medicine                 First Provider Note            21  10:50 PM EST    Date of Encounter: No admission date for patient encounter. Patient Name: Hussain Peace  : 1995  MRN: [id number]    Chief Complaint: Abdominal Pain (lower ABD and uterus area for 3 days, worse when sits, no other complaints)      History of Present Illness:   Hussain Peace is a 31 y.o. female who presents to the ED for 3-day history of lower suprapubic abdominal pain. Patient reports that over the last 3 days she has pain constantly except when she sleeping. She denies pain anywhere else. She denies any vaginal discharge or vaginal concerns as well as no concerns for an STD she also denies any urinary symptoms with this. No back or flank pain. Does have a regular OB/GYN. Denies nausea, vomiting or diarrhea. Focused Physical Exam:  VS:    ED Triage Vitals   BP Temp Temp src Pulse Resp SpO2 Height Weight   21 2243 219 -- 21 22321 2239 21 2243 21 2243   (!) 169/109 98.7 °F (37.1 °C)  113 17 96 % 5' 4\" (1.626 m) 255 lb (115.7 kg)        Physical Ex: Constitutional: Alert and non-toxic. Medical History:  has a past medical history of ADHD (attention deficit hyperactivity disorder), Hypertension, MDRO (multiple drug resistant organisms) resistance, Obesity, and Thyromegaly. Surgical History:  has a past surgical history that includes Cholecystectomy, laparoscopic (2017). Social History:  reports that she has quit smoking. She has never used smokeless tobacco. She reports previous alcohol use. She reports that she does not use drugs. Family History: family history includes Coronary Art Dis in her father; Hypertension in her mother.     Allergies: Banana, Nickel, and Shellfish-derived products     Initial Plan of Care: Initiate Treatment-Testing, Proceed toTreatment Area When Bed

## 2022-04-16 ENCOUNTER — HOSPITAL ENCOUNTER (EMERGENCY)
Age: 27
Discharge: HOME | End: 2022-04-16
Attending: EMERGENCY MEDICINE
Payer: COMMERCIAL

## 2022-04-16 ENCOUNTER — APPOINTMENT (OUTPATIENT)
Dept: GENERAL RADIOLOGY | Age: 27
End: 2022-04-16
Payer: COMMERCIAL

## 2022-04-16 VITALS
TEMPERATURE: 98 F | BODY MASS INDEX: 46.1 KG/M2 | WEIGHT: 270 LBS | DIASTOLIC BLOOD PRESSURE: 74 MMHG | SYSTOLIC BLOOD PRESSURE: 118 MMHG | RESPIRATION RATE: 18 BRPM | OXYGEN SATURATION: 99 % | HEART RATE: 95 BPM | HEIGHT: 64 IN

## 2022-04-16 DIAGNOSIS — R00.2 PALPITATIONS: Primary | ICD-10-CM

## 2022-04-16 LAB
ALBUMIN SERPL-MCNC: 4.2 G/DL (ref 3.5–5.2)
ALP BLD-CCNC: 123 U/L (ref 35–104)
ALT SERPL-CCNC: 12 U/L (ref 0–32)
ANION GAP SERPL CALCULATED.3IONS-SCNC: 12 MMOL/L (ref 7–16)
ANISOCYTOSIS: ABNORMAL
AST SERPL-CCNC: 18 U/L (ref 0–31)
BASOPHILS ABSOLUTE: 0.06 E9/L (ref 0–0.2)
BASOPHILS RELATIVE PERCENT: 0.6 % (ref 0–2)
BILIRUB SERPL-MCNC: <0.2 MG/DL (ref 0–1.2)
BUN BLDV-MCNC: 12 MG/DL (ref 6–20)
CALCIUM SERPL-MCNC: 9.7 MG/DL (ref 8.6–10.2)
CHLORIDE BLD-SCNC: 104 MMOL/L (ref 98–107)
CO2: 23 MMOL/L (ref 22–29)
CREAT SERPL-MCNC: 0.8 MG/DL (ref 0.5–1)
EOSINOPHILS ABSOLUTE: 0.21 E9/L (ref 0.05–0.5)
EOSINOPHILS RELATIVE PERCENT: 2 % (ref 0–6)
GFR AFRICAN AMERICAN: >60
GFR NON-AFRICAN AMERICAN: >60 ML/MIN/1.73
GLUCOSE BLD-MCNC: 94 MG/DL (ref 74–99)
HCG, URINE, POC: NEGATIVE
HCT VFR BLD CALC: 37.1 % (ref 34–48)
HEMOGLOBIN: 11.4 G/DL (ref 11.5–15.5)
HYPOCHROMIA: ABNORMAL
IMMATURE GRANULOCYTES #: 0.02 E9/L
IMMATURE GRANULOCYTES %: 0.2 % (ref 0–5)
LYMPHOCYTES ABSOLUTE: 3.45 E9/L (ref 1.5–4)
LYMPHOCYTES RELATIVE PERCENT: 32.7 % (ref 20–42)
Lab: NORMAL
MCH RBC QN AUTO: 20.2 PG (ref 26–35)
MCHC RBC AUTO-ENTMCNC: 30.7 % (ref 32–34.5)
MCV RBC AUTO: 65.8 FL (ref 80–99.9)
MONOCYTES ABSOLUTE: 0.64 E9/L (ref 0.1–0.95)
MONOCYTES RELATIVE PERCENT: 6.1 % (ref 2–12)
NEGATIVE QC PASS/FAIL: NORMAL
NEUTROPHILS ABSOLUTE: 6.17 E9/L (ref 1.8–7.3)
NEUTROPHILS RELATIVE PERCENT: 58.4 % (ref 43–80)
OVALOCYTES: ABNORMAL
PDW BLD-RTO: 17.6 FL (ref 11.5–15)
PLATELET # BLD: 238 E9/L (ref 130–450)
PMV BLD AUTO: ABNORMAL FL (ref 7–12)
POIKILOCYTES: ABNORMAL
POSITIVE QC PASS/FAIL: NORMAL
POTASSIUM REFLEX MAGNESIUM: 4.2 MMOL/L (ref 3.5–5)
RBC # BLD: 5.64 E12/L (ref 3.5–5.5)
SCHISTOCYTES: ABNORMAL
SMUDGE CELLS: ABNORMAL
SODIUM BLD-SCNC: 139 MMOL/L (ref 132–146)
TOTAL PROTEIN: 7.4 G/DL (ref 6.4–8.3)
TROPONIN, HIGH SENSITIVITY: <6 NG/L (ref 0–9)
WBC # BLD: 10.6 E9/L (ref 4.5–11.5)

## 2022-04-16 PROCEDURE — 80053 COMPREHEN METABOLIC PANEL: CPT

## 2022-04-16 PROCEDURE — 84484 ASSAY OF TROPONIN QUANT: CPT

## 2022-04-16 PROCEDURE — 85025 COMPLETE CBC W/AUTO DIFF WBC: CPT

## 2022-04-16 PROCEDURE — 93005 ELECTROCARDIOGRAM TRACING: CPT | Performed by: EMERGENCY MEDICINE

## 2022-04-16 PROCEDURE — 71045 X-RAY EXAM CHEST 1 VIEW: CPT

## 2022-04-16 PROCEDURE — 99284 EMERGENCY DEPT VISIT MOD MDM: CPT

## 2022-04-16 ASSESSMENT — PAIN DESCRIPTION - ORIENTATION: ORIENTATION: LEFT;MID

## 2022-04-16 ASSESSMENT — ENCOUNTER SYMPTOMS
ABDOMINAL PAIN: 0
SORE THROAT: 0
EYE PAIN: 0
DIARRHEA: 0
SHORTNESS OF BREATH: 0
VOMITING: 0
NAUSEA: 0
BACK PAIN: 0
COUGH: 0

## 2022-04-16 ASSESSMENT — PAIN - FUNCTIONAL ASSESSMENT: PAIN_FUNCTIONAL_ASSESSMENT: 0-10

## 2022-04-16 ASSESSMENT — PAIN DESCRIPTION - PAIN TYPE: TYPE: ACUTE PAIN

## 2022-04-16 ASSESSMENT — PAIN SCALES - GENERAL: PAINLEVEL_OUTOF10: 3

## 2022-04-16 ASSESSMENT — PAIN DESCRIPTION - DESCRIPTORS: DESCRIPTORS: TIGHTNESS

## 2022-04-16 ASSESSMENT — PAIN DESCRIPTION - FREQUENCY: FREQUENCY: CONTINUOUS

## 2022-04-16 NOTE — ED PROVIDER NOTES
HPI   Patient is a 31 y.o. female with a past medical history of ADHD, hypertension, presenting to the Emergency Department for palpitations. History obtained by patient. Symptoms are mild in severity and intermittent since onset. They are improved by nothing and worsened by nothing. Patient presents for palpitations. Started at 6 AM.  Its been intermittent since. States last for few seconds and resolves. States it feels somewhat similar to panic attacks that she has racing of her heart and heartbeats with that but states this does not feel like a panic attack. She states sometimes her heart is racing she is still short of breath. She denies any pain in her chest.  She does have a family cardiac history and states her dad had a heart attack in his late 35s. No history of sudden cardiac death. But her mother is adopted and she is unsure about their history. She denies any leg pain or leg swelling. No history of DVT or PE. No recent car trips, travel, surgery, hemoptysis. Review of Systems   Constitutional: Negative for chills and fever. HENT: Negative for congestion, ear pain and sore throat. Eyes: Negative for pain and visual disturbance. Respiratory: Negative for cough and shortness of breath. Cardiovascular: Positive for palpitations. Negative for chest pain. Gastrointestinal: Negative for abdominal pain, diarrhea, nausea and vomiting. Genitourinary: Negative for dysuria and frequency. Musculoskeletal: Negative for arthralgias and back pain. Skin: Negative for rash and wound. Neurological: Negative for weakness and headaches. All other systems reviewed and are negative. Physical Exam  Vitals and nursing note reviewed. Constitutional:       General: She is not in acute distress. Appearance: Normal appearance. She is well-developed. She is not ill-appearing. HENT:      Head: Normocephalic and atraumatic.    Eyes:      Extraocular Movements: Extraocular movements intact. Pupils: Pupils are equal, round, and reactive to light. Cardiovascular:      Rate and Rhythm: Normal rate and regular rhythm. Pulses: Normal pulses. Pulmonary:      Effort: Pulmonary effort is normal. No respiratory distress. Breath sounds: Normal breath sounds. No wheezing or rales. Abdominal:      Palpations: Abdomen is soft. Tenderness: There is no abdominal tenderness. There is no guarding or rebound. Musculoskeletal:         General: Normal range of motion. Cervical back: Normal range of motion and neck supple. Right lower leg: No edema. Left lower leg: No edema. Skin:     General: Skin is warm and dry. Capillary Refill: Capillary refill takes less than 2 seconds. Neurological:      Mental Status: She is alert and oriented to person, place, and time. Cranial Nerves: No cranial nerve deficit. Coordination: Coordination normal.          Procedures     MDM   Patient presented to the Emergency Department for palpitations. They are clinically stable, vital signs stable, non toxic appearing. Work-up initiated. She does not have any active chest pain. She has not had chest pain. Has only palpitations. EKG reassuring. Troponin less than 6. She does have family cardiac history. Heart score of 1. Without any any active chest pain and has not had chest pain. no need for repeat troponin. Wells low risk, PERC negative, less concern for pulmonary embolism. Rest of labs reassuring. She is her baseline anemia which actually is improved from baseline. With reassuring history and physical exam patient okay for close outpatient evaluation and care. Educated patient on symptoms, diagnosis and supportive care. Will follow-up and establish care    Strict return precautions were discussed including but not limited too chest pain, shortness of breath, leg pain or swelling, new or worsening symtpoms.  They verbalized understanding and were agreeable with the plan. All questions were answered and patient was discharged. --------------------------------------------- PAST HISTORY ---------------------------------------------  Past Medical History:  has a past medical history of ADHD (attention deficit hyperactivity disorder), Hypertension, MDRO (multiple drug resistant organisms) resistance, Obesity, and Thyromegaly. Past Surgical History:  has a past surgical history that includes Cholecystectomy, laparoscopic (05/03/2017). Social History:  reports that she has quit smoking. She has never used smokeless tobacco. She reports previous alcohol use. She reports current drug use. Drug: Marijuana Gosher [employer]is). Family History: family history includes Coronary Art Dis in her father; Hypertension in her mother. The patients home medications have been reviewed.     Allergies: Banana, Nickel, and Shellfish-derived products    -------------------------------------------------- RESULTS -------------------------------------------------  Labs:  Results for orders placed or performed during the hospital encounter of 04/16/22   CBC with Auto Differential   Result Value Ref Range    WBC 10.6 4.5 - 11.5 E9/L    RBC 5.64 (H) 3.50 - 5.50 E12/L    Hemoglobin 11.4 (L) 11.5 - 15.5 g/dL    Hematocrit 37.1 34.0 - 48.0 %    MCV 65.8 (L) 80.0 - 99.9 fL    MCH 20.2 (L) 26.0 - 35.0 pg    MCHC 30.7 (L) 32.0 - 34.5 %    RDW 17.6 (H) 11.5 - 15.0 fL    Platelets 055 472 - 152 E9/L    MPV NOT CALC 7.0 - 12.0 fL    Smudge Cells 1+     Anisocytosis 1+     Hypochromia 2+     Poikilocytes 1+     Schistocytes 1+     Ovalocytes 1+    Comprehensive Metabolic Panel w/ Reflex to MG   Result Value Ref Range    Sodium 139 132 - 146 mmol/L    Potassium reflex Magnesium 4.2 3.5 - 5.0 mmol/L    Chloride 104 98 - 107 mmol/L    CO2 23 22 - 29 mmol/L    Anion Gap 12 7 - 16 mmol/L    Glucose 94 74 - 99 mg/dL    BUN 12 6 - 20 mg/dL    CREATININE 0.8 0.5 - 1.0 mg/dL    GFR Non-African American >60 >=60 mL/min/1.73    GFR African American >60     Calcium 9.7 8.6 - 10.2 mg/dL    Total Protein 7.4 6.4 - 8.3 g/dL    Albumin 4.2 3.5 - 5.2 g/dL    Total Bilirubin <0.2 0.0 - 1.2 mg/dL    Alkaline Phosphatase 123 (H) 35 - 104 U/L    ALT 12 0 - 32 U/L    AST 18 0 - 31 U/L   Troponin   Result Value Ref Range    Troponin, High Sensitivity <6 0 - 9 ng/L   POC Pregnancy Urine   Result Value Ref Range    HCG, Urine, POC Negative Negative    Lot Number [id number]     Positive QC Pass/Fail Pass     Negative QC Pass/Fail Pass        Radiology:  XR CHEST PORTABLE   Final Result   No acute cardiopulmonary disease. EKG:  This EKG is signed and interpreted by ED Physician. Time:  0708   Rate: 88  Rhythm: Sinus. Interpretation: no acute changes. Normal axis. No STEMI. QTc 445. T wave inversion lead III. Comparison: stable as compared to patient's most recent EKG.      ------------------------- NURSING NOTES AND VITALS REVIEWED ---------------------------  Date / Time Roomed:  4/16/2022  7:03 AM  ED Bed Assignment:  01/01    The nursing notes within the ED encounter and vital signs as below have been reviewed. /74   Pulse 95   Temp 98 °F (36.7 °C)   Resp 18   Ht 5' 4\" (1.626 m)   Wt 270 lb (122.5 kg)   LMP 03/28/2022   SpO2 99%   BMI 46.35 kg/m²   Oxygen Saturation Interpretation: Normal      ------------------------------------------ PROGRESS NOTES ------------------------------------------  ED COURSE MEDICATIONS:              Medications - No data to display    I have spoken with the patient and discussed todays results, in addition to providing specific details for the plan of care and counseling regarding the diagnosis and prognosis. Their questions are answered at this time and they are agreeable with the plan. I discussed at length with them reasons for immediate return here for re evaluation.  They will followup with primary care by calling their office tomorrow. --------------------------------- ADDITIONAL PROVIDER NOTES ---------------------------------  At this time the patient is without objective evidence of an acute process requiring hospitalization or inpatient management. They have remained hemodynamically stable throughout their entire ED visit and are stable for discharge with outpatient follow-up. The plan has been discussed in detail and they are aware of the specific conditions for emergent return, as well as the importance of follow-up. Discharge Medication List as of 4/16/2022 10:00 AM          Diagnosis:  1. Palpitations        Disposition:  Patient's disposition: Discharge to home  Patient's condition is stable.         Elian Hansen DO  Resident  04/16/22 1016

## 2022-04-16 NOTE — ED NOTES
POC preg negative patient is changing out of gown into street clothes she stated that her  needs to leave and she needs to get home     Poly Meyer RN  04/16/22 [id number]

## 2022-04-16 NOTE — ED NOTES
Urine cup was left at bed side for patient to provide sample when able      Grazyna Doty RN  04/16/22 9714

## 2022-04-18 LAB
EKG ATRIAL RATE: 88 BPM
EKG P AXIS: 39 DEGREES
EKG P-R INTERVAL: 138 MS
EKG Q-T INTERVAL: 368 MS
EKG QRS DURATION: 88 MS
EKG QTC CALCULATION (BAZETT): 445 MS
EKG R AXIS: 32 DEGREES
EKG T AXIS: -6 DEGREES
EKG VENTRICULAR RATE: 88 BPM

## 2023-02-06 SDOH — HEALTH STABILITY: PHYSICAL HEALTH: ON AVERAGE, HOW MANY DAYS PER WEEK DO YOU ENGAGE IN MODERATE TO STRENUOUS EXERCISE (LIKE A BRISK WALK)?: 3 DAYS

## 2023-02-06 SDOH — HEALTH STABILITY: PHYSICAL HEALTH: ON AVERAGE, HOW MANY MINUTES DO YOU ENGAGE IN EXERCISE AT THIS LEVEL?: 20 MIN

## 2023-02-06 ASSESSMENT — SOCIAL DETERMINANTS OF HEALTH (SDOH)
WITHIN THE LAST YEAR, HAVE YOU BEEN KICKED, HIT, SLAPPED, OR OTHERWISE PHYSICALLY HURT BY YOUR PARTNER OR EX-PARTNER?: NO
WITHIN THE LAST YEAR, HAVE YOU BEEN AFRAID OF YOUR PARTNER OR EX-PARTNER?: NO
WITHIN THE LAST YEAR, HAVE TO BEEN RAPED OR FORCED TO HAVE ANY KIND OF SEXUAL ACTIVITY BY YOUR PARTNER OR EX-PARTNER?: NO
WITHIN THE LAST YEAR, HAVE YOU BEEN HUMILIATED OR EMOTIONALLY ABUSED IN OTHER WAYS BY YOUR PARTNER OR EX-PARTNER?: NO

## 2023-02-09 ENCOUNTER — OFFICE VISIT (OUTPATIENT)
Dept: INTERNAL MEDICINE | Age: 28
End: 2023-02-09
Payer: COMMERCIAL

## 2023-02-09 VITALS
HEART RATE: 97 BPM | WEIGHT: 284.8 LBS | DIASTOLIC BLOOD PRESSURE: 80 MMHG | OXYGEN SATURATION: 98 % | HEIGHT: 64 IN | BODY MASS INDEX: 48.62 KG/M2 | TEMPERATURE: 97 F | SYSTOLIC BLOOD PRESSURE: 136 MMHG | RESPIRATION RATE: 20 BRPM

## 2023-02-09 DIAGNOSIS — Z12.4 CERVICAL CANCER SCREENING: ICD-10-CM

## 2023-02-09 DIAGNOSIS — R51.9 NONINTRACTABLE HEADACHE, UNSPECIFIED CHRONICITY PATTERN, UNSPECIFIED HEADACHE TYPE: Primary | ICD-10-CM

## 2023-02-09 DIAGNOSIS — Z00.00 HEALTHCARE MAINTENANCE: ICD-10-CM

## 2023-02-09 DIAGNOSIS — I10 ESSENTIAL HYPERTENSION: ICD-10-CM

## 2023-02-09 DIAGNOSIS — E66.01 CLASS 3 SEVERE OBESITY DUE TO EXCESS CALORIES WITHOUT SERIOUS COMORBIDITY WITH BODY MASS INDEX (BMI) OF 40.0 TO 44.9 IN ADULT (HCC): ICD-10-CM

## 2023-02-09 PROCEDURE — G8484 FLU IMMUNIZE NO ADMIN: HCPCS

## 2023-02-09 PROCEDURE — 99203 OFFICE O/P NEW LOW 30 MIN: CPT

## 2023-02-09 PROCEDURE — G8417 CALC BMI ABV UP PARAM F/U: HCPCS

## 2023-02-09 PROCEDURE — G8427 DOCREV CUR MEDS BY ELIG CLIN: HCPCS

## 2023-02-09 PROCEDURE — 3078F DIAST BP <80 MM HG: CPT

## 2023-02-09 PROCEDURE — 3074F SYST BP LT 130 MM HG: CPT

## 2023-02-09 PROCEDURE — 1036F TOBACCO NON-USER: CPT

## 2023-02-09 SDOH — ECONOMIC STABILITY: TRANSPORTATION INSECURITY
IN THE PAST 12 MONTHS, HAS LACK OF TRANSPORTATION KEPT YOU FROM MEETINGS, WORK, OR FROM GETTING THINGS NEEDED FOR DAILY LIVING?: NO

## 2023-02-09 SDOH — HEALTH STABILITY: PHYSICAL HEALTH: ON AVERAGE, HOW MANY MINUTES DO YOU ENGAGE IN EXERCISE AT THIS LEVEL?: 20 MIN

## 2023-02-09 SDOH — ECONOMIC STABILITY: HOUSING INSECURITY
IN THE LAST 12 MONTHS, WAS THERE A TIME WHEN YOU DID NOT HAVE A STEADY PLACE TO SLEEP OR SLEPT IN A SHELTER (INCLUDING NOW)?: NO

## 2023-02-09 SDOH — ECONOMIC STABILITY: HOUSING INSECURITY: IN THE LAST 12 MONTHS, HOW MANY PLACES HAVE YOU LIVED?: 1

## 2023-02-09 SDOH — ECONOMIC STABILITY: FOOD INSECURITY: WITHIN THE PAST 12 MONTHS, YOU WORRIED THAT YOUR FOOD WOULD RUN OUT BEFORE YOU GOT MONEY TO BUY MORE.: NEVER TRUE

## 2023-02-09 SDOH — HEALTH STABILITY: PHYSICAL HEALTH: ON AVERAGE, HOW MANY DAYS PER WEEK DO YOU ENGAGE IN MODERATE TO STRENUOUS EXERCISE (LIKE A BRISK WALK)?: 3 DAYS

## 2023-02-09 SDOH — ECONOMIC STABILITY: FOOD INSECURITY: WITHIN THE PAST 12 MONTHS, THE FOOD YOU BOUGHT JUST DIDN'T LAST AND YOU DIDN'T HAVE MONEY TO GET MORE.: NEVER TRUE

## 2023-02-09 SDOH — ECONOMIC STABILITY: TRANSPORTATION INSECURITY
IN THE PAST 12 MONTHS, HAS THE LACK OF TRANSPORTATION KEPT YOU FROM MEDICAL APPOINTMENTS OR FROM GETTING MEDICATIONS?: NO

## 2023-02-09 SDOH — ECONOMIC STABILITY: INCOME INSECURITY: IN THE LAST 12 MONTHS, WAS THERE A TIME WHEN YOU WERE NOT ABLE TO PAY THE MORTGAGE OR RENT ON TIME?: NO

## 2023-02-09 ASSESSMENT — LIFESTYLE VARIABLES
HOW OFTEN DO YOU HAVE A DRINK CONTAINING ALCOHOL: NEVER
HOW MANY STANDARD DRINKS CONTAINING ALCOHOL DO YOU HAVE ON A TYPICAL DAY: PATIENT DOES NOT DRINK

## 2023-02-09 ASSESSMENT — PATIENT HEALTH QUESTIONNAIRE - PHQ9
1. LITTLE INTEREST OR PLEASURE IN DOING THINGS: 1
SUM OF ALL RESPONSES TO PHQ QUESTIONS 1-9: 10
6. FEELING BAD ABOUT YOURSELF - OR THAT YOU ARE A FAILURE OR HAVE LET YOURSELF OR YOUR FAMILY DOWN: 3
3. TROUBLE FALLING OR STAYING ASLEEP: 0
7. TROUBLE CONCENTRATING ON THINGS, SUCH AS READING THE NEWSPAPER OR WATCHING TELEVISION: 3
SUM OF ALL RESPONSES TO PHQ QUESTIONS 1-9: 10
2. FEELING DOWN, DEPRESSED OR HOPELESS: 3
5. POOR APPETITE OR OVEREATING: 0
SUM OF ALL RESPONSES TO PHQ QUESTIONS 1-9: 10
SUM OF ALL RESPONSES TO PHQ9 QUESTIONS 1 & 2: 4
SUM OF ALL RESPONSES TO PHQ QUESTIONS 1-9: 10
9. THOUGHTS THAT YOU WOULD BE BETTER OFF DEAD, OR OF HURTING YOURSELF: 0
4. FEELING TIRED OR HAVING LITTLE ENERGY: 0
10. IF YOU CHECKED OFF ANY PROBLEMS, HOW DIFFICULT HAVE THESE PROBLEMS MADE IT FOR YOU TO DO YOUR WORK, TAKE CARE OF THINGS AT HOME, OR GET ALONG WITH OTHER PEOPLE: 0
8. MOVING OR SPEAKING SO SLOWLY THAT OTHER PEOPLE COULD HAVE NOTICED. OR THE OPPOSITE, BEING SO FIGETY OR RESTLESS THAT YOU HAVE BEEN MOVING AROUND A LOT MORE THAN USUAL: 0

## 2023-02-09 ASSESSMENT — ENCOUNTER SYMPTOMS
COUGH: 0
NAUSEA: 0
SHORTNESS OF BREATH: 0
ABDOMINAL PAIN: 0
SINUS PAIN: 0
DIARRHEA: 0
VOMITING: 0

## 2023-02-09 ASSESSMENT — SOCIAL DETERMINANTS OF HEALTH (SDOH)
WITHIN THE LAST YEAR, HAVE TO BEEN RAPED OR FORCED TO HAVE ANY KIND OF SEXUAL ACTIVITY BY YOUR PARTNER OR EX-PARTNER?: NO
HOW HARD IS IT FOR YOU TO PAY FOR THE VERY BASICS LIKE FOOD, HOUSING, MEDICAL CARE, AND HEATING?: NOT VERY HARD
WITHIN THE LAST YEAR, HAVE YOU BEEN HUMILIATED OR EMOTIONALLY ABUSED IN OTHER WAYS BY YOUR PARTNER OR EX-PARTNER?: NO
WITHIN THE LAST YEAR, HAVE YOU BEEN AFRAID OF YOUR PARTNER OR EX-PARTNER?: NO
WITHIN THE LAST YEAR, HAVE YOU BEEN KICKED, HIT, SLAPPED, OR OTHERWISE PHYSICALLY HURT BY YOUR PARTNER OR EX-PARTNER?: NO

## 2023-02-09 NOTE — PATIENT INSTRUCTIONS
Dear Davon Clark,    Thank you for coming to your appointment today. I hope we have addressed all of your needs. Please make sure to do the following:  - Continue your medications as listed. - Get labs drawn before our next follow up. - Referrals have been made to OBGYN, ophthamology:  If you do not hear from the office in 1 week, please call the number listed. - We will see each other again in 3 months    Call for a sooner appointment if you develop new or worsening symptoms    Have a great day!     Sincerely,  Adrienne Moore MD PGY-1

## 2023-02-09 NOTE — PROGRESS NOTES
[facility]    Attending Physician Statement  I have discussed the case, including pertinent history and exam findings with the resident physician. I have seen and examined the patient and the key elements of the encounter have been performed by me. I agree with the assessment, plan and orders as documented by the resident. I have reviewed all pertinent PMHx, PSHx, FamHx, SocialHx, medications, and allergies and updated history as appropriate. Patient presents for a new patient appointment to establish care. Hypertension   Previously on Losartan in 2018   Counseled surveillance and lifestyle changes   If BP high next visit recommend med    Obesity class 3 (BMI 48)   Significant lifestyle changes in January   Counseled carb reduction, avoid soda/sugar in drinks  Consider medical weight loss referral in future -- she prefers to defer for this for now as she is recently initiating changes herself    Headaches, tension type  Recommend optho eval for eyeglasses, previously had but broke    Hx ADHD and depression  Previously on Vivance in highschool, no meds as of recent and stable    Microcytosis c/w B thalassemia    Repeat CBC but no longer needs Fe    Screening:   Defers flu shot   COVID booster at pharmacy   GYN referral for routine screen    Remainder of medical problems as per resident note.     Keon Hankins DO  2/9/2023 3:32 PM

## 2023-02-09 NOTE — PROGRESS NOTES
[facility]  [facility]  ACC Note      SUBJECTIVE:  CC: new patient    HPI: Patient presents today as a new patient to this clinic. She would like to establish care with this clinic. States she would like to lose weight so she can be more active with her 3year old son. PMHx:  has a past medical history of ADHD (attention deficit hyperactivity disorder), Hypertension, MDRO (multiple drug resistant organisms) resistance, Obesity, and Thyromegaly. Allergies   Allergen Reactions    Banana Itching    Nickel     Shellfish-Derived Products Swelling        PSHx:  has a past surgical history that includes Cholecystectomy, laparoscopic (2017). OBYGN Hx:     Sexual Hx: sexually active with 1 partner ()    Social Hx: smokes marijuana about 3x weekly     Fam Hx:   Family History   Problem Relation Age of Onset    Hypertension Mother     Coronary Art Dis Father       Current Outpatient Medications   Medication Instructions    docusate sodium (COLACE) 100 mg, 2 TIMES DAILY    ferrous sulfate (IRON 325) 325 mg, DAILY WITH BREAKFAST    ibuprofen (ADVIL;MOTRIN) 800 mg, Oral, EVERY 8 HOURS PRN        Review of Systems   Constitutional:  Negative for chills, fatigue and fever. HENT:  Negative for sinus pain. Respiratory:  Negative for cough and shortness of breath. Cardiovascular:  Negative for chest pain, palpitations and leg swelling. Gastrointestinal:  Negative for abdominal pain, diarrhea, nausea and vomiting. Musculoskeletal:  Negative for arthralgias and myalgias. Neurological:  Negative for dizziness, tremors, syncope, weakness and headaches. No outpatient medications have been marked as taking for the 23 encounter (Appointment) with Jose Khalil MD.       I have reviewed all pertinent PMHx, PSHx, FamHx, SocialHx, medications, and allergies and updated history as appropriate.     OBJECTIVE:    VS: There were no vitals taken for this visit. Physical Exam  Vitals reviewed. Constitutional:       Appearance: Normal appearance. She is obese. HENT:      Head: Normocephalic and atraumatic. Nose: Nose normal.      Mouth/Throat:      Mouth: Mucous membranes are moist.      Pharynx: Oropharynx is clear. Eyes:      Pupils: Pupils are equal, round, and reactive to light. Cardiovascular:      Rate and Rhythm: Normal rate and regular rhythm. Pulses: Normal pulses. Heart sounds: Normal heart sounds. Pulmonary:      Effort: Pulmonary effort is normal.      Breath sounds: Normal breath sounds. Abdominal:      General: Abdomen is flat. Palpations: Abdomen is soft. Musculoskeletal:         General: Normal range of motion. Cervical back: Normal range of motion and neck supple. Skin:     General: Skin is warm. Neurological:      General: No focal deficit present. Mental Status: She is alert and oriented to person, place, and time. Psychiatric:         Mood and Affect: Mood normal.         Behavior: Behavior normal.         Thought Content: Thought content normal.         Judgment: Judgment normal.       PLAN:  HTN  /80  Counseled on lifestyle modifications  Will re-evaluate next month if need to begin an anti-hypertensive     2. Headaches  States she has daily headaches; works from home and squints at screen  Opthalmology referral placed     3. Obesity  BMI 48.89  Patient made recent diet changes to avoid processed foods and incorporate more fruit/vegetables  Does not wish to go to the weight loss clinic at this time    4. Healthcare maintenance  Declined flu vaccine (mother had bad reaction in past)  Will get covid booster at pharmacy  HIV, hepatitis C screens ordered  Pap smear, referral to OBGYN made today  TSH, lipid panel, A1C, CBC, CMP ordered    5.  History of ADHD, depression  PHQ-9 score: 10  Was previously on Vyvanse; feels she is struggling at work with focusing but did not like the way Vyvanse made her feel  Feels she is handling her depression well without medication    RTC in 3 months    I have reviewed my findings and recommendations with Angie Woodard and Dr Hoa Gruber MD PGY-1  2/9/2023 11:07 AM

## 2023-04-21 ENCOUNTER — OFFICE VISIT (OUTPATIENT)
Dept: INTERNAL MEDICINE | Age: 28
End: 2023-04-21
Payer: COMMERCIAL

## 2023-04-21 VITALS
BODY MASS INDEX: 49.39 KG/M2 | WEIGHT: 289.3 LBS | HEIGHT: 64 IN | TEMPERATURE: 96.9 F | HEART RATE: 92 BPM | DIASTOLIC BLOOD PRESSURE: 78 MMHG | RESPIRATION RATE: 16 BRPM | SYSTOLIC BLOOD PRESSURE: 132 MMHG | OXYGEN SATURATION: 99 %

## 2023-04-21 DIAGNOSIS — F90.9 ATTENTION DEFICIT HYPERACTIVITY DISORDER (ADHD), UNSPECIFIED ADHD TYPE: Primary | ICD-10-CM

## 2023-04-21 PROCEDURE — 99214 OFFICE O/P EST MOD 30 MIN: CPT

## 2023-04-21 PROCEDURE — G8427 DOCREV CUR MEDS BY ELIG CLIN: HCPCS

## 2023-04-21 PROCEDURE — 99212 OFFICE O/P EST SF 10 MIN: CPT

## 2023-04-21 PROCEDURE — 3078F DIAST BP <80 MM HG: CPT

## 2023-04-21 PROCEDURE — 1036F TOBACCO NON-USER: CPT

## 2023-04-21 PROCEDURE — 3074F SYST BP LT 130 MM HG: CPT

## 2023-04-21 PROCEDURE — G8417 CALC BMI ABV UP PARAM F/U: HCPCS

## 2023-04-21 ASSESSMENT — ENCOUNTER SYMPTOMS
ABDOMINAL PAIN: 0
VOMITING: 0
COUGH: 0
SINUS PAIN: 0
DIARRHEA: 0
SHORTNESS OF BREATH: 0
NAUSEA: 0

## 2023-06-26 ENCOUNTER — TELEPHONE (OUTPATIENT)
Dept: INTERNAL MEDICINE | Age: 28
End: 2023-06-26

## 2023-06-26 DIAGNOSIS — F90.9 ATTENTION DEFICIT HYPERACTIVITY DISORDER (ADHD), UNSPECIFIED ADHD TYPE: Primary | ICD-10-CM

## 2024-01-29 ENCOUNTER — HOSPITAL ENCOUNTER (EMERGENCY)
Age: 29
Discharge: HOME | End: 2024-01-29
Payer: COMMERCIAL

## 2024-01-29 ENCOUNTER — APPOINTMENT (OUTPATIENT)
Dept: CT IMAGING | Age: 29
End: 2024-01-29
Payer: COMMERCIAL

## 2024-01-29 VITALS
OXYGEN SATURATION: 100 % | HEART RATE: 84 BPM | SYSTOLIC BLOOD PRESSURE: 173 MMHG | TEMPERATURE: 98.1 F | DIASTOLIC BLOOD PRESSURE: 105 MMHG | BODY MASS INDEX: 49.57 KG/M2 | RESPIRATION RATE: 18 BRPM | WEIGHT: 288.8 LBS

## 2024-01-29 DIAGNOSIS — R82.71 BACTERIA IN URINE: ICD-10-CM

## 2024-01-29 DIAGNOSIS — R10.9 ACUTE RIGHT FLANK PAIN: Primary | ICD-10-CM

## 2024-01-29 DIAGNOSIS — R03.0 ELEVATED BLOOD PRESSURE READING: ICD-10-CM

## 2024-01-29 DIAGNOSIS — R11.14 BILIOUS VOMITING WITH NAUSEA: ICD-10-CM

## 2024-01-29 LAB
ALBUMIN SERPL-MCNC: 4.5 G/DL (ref 3.5–5.2)
ALP SERPL-CCNC: 104 U/L (ref 35–104)
ALT SERPL-CCNC: 18 U/L (ref 0–32)
ANION GAP SERPL CALCULATED.3IONS-SCNC: 11 MMOL/L (ref 7–16)
AST SERPL-CCNC: 32 U/L (ref 0–31)
BACTERIA URNS QL MICRO: ABNORMAL
BASOPHILS # BLD: 0.03 K/UL (ref 0–0.2)
BASOPHILS NFR BLD: 0 % (ref 0–2)
BILIRUB SERPL-MCNC: 0.2 MG/DL (ref 0–1.2)
BILIRUB UR QL STRIP: NEGATIVE
BUN SERPL-MCNC: 10 MG/DL (ref 6–20)
CALCIUM SERPL-MCNC: 9.2 MG/DL (ref 8.6–10.2)
CHLORIDE SERPL-SCNC: 103 MMOL/L (ref 98–107)
CLARITY UR: CLEAR
CO2 SERPL-SCNC: 23 MMOL/L (ref 22–29)
COLOR UR: YELLOW
CREAT SERPL-MCNC: 0.8 MG/DL (ref 0.5–1)
EOSINOPHIL # BLD: 0.06 K/UL (ref 0.05–0.5)
EOSINOPHILS RELATIVE PERCENT: 1 % (ref 0–6)
ERYTHROCYTE [DISTWIDTH] IN BLOOD BY AUTOMATED COUNT: 18.9 % (ref 11.5–15)
GFR SERPL CREATININE-BSD FRML MDRD: >60 ML/MIN/1.73M2
GLUCOSE SERPL-MCNC: 105 MG/DL (ref 74–99)
GLUCOSE UR STRIP-MCNC: NEGATIVE MG/DL
HCG, URINE, POC: NEGATIVE
HCT VFR BLD AUTO: 41 % (ref 34–48)
HGB BLD-MCNC: 12.5 G/DL (ref 11.5–15.5)
HGB UR QL STRIP.AUTO: ABNORMAL
IMM GRANULOCYTES # BLD AUTO: 0.03 K/UL (ref 0–0.58)
IMM GRANULOCYTES NFR BLD: 0 % (ref 0–5)
KETONES UR STRIP-MCNC: NEGATIVE MG/DL
LACTATE BLDV-SCNC: 1.3 MMOL/L (ref 0.5–2.2)
LEUKOCYTE ESTERASE UR QL STRIP: NEGATIVE
LIPASE SERPL-CCNC: 24 U/L (ref 13–60)
LYMPHOCYTES NFR BLD: 2.14 K/UL (ref 1.5–4)
LYMPHOCYTES RELATIVE PERCENT: 16 % (ref 20–42)
Lab: NORMAL
MCH RBC QN AUTO: 20.6 PG (ref 26–35)
MCHC RBC AUTO-ENTMCNC: 30.5 G/DL (ref 32–34.5)
MCV RBC AUTO: 67.7 FL (ref 80–99.9)
MONOCYTES NFR BLD: 0.4 K/UL (ref 0.1–0.95)
MONOCYTES NFR BLD: 3 % (ref 2–12)
NEGATIVE QC PASS/FAIL: NORMAL
NEUTROPHILS NFR BLD: 80 % (ref 43–80)
NEUTS SEG NFR BLD: 10.66 K/UL (ref 1.8–7.3)
NITRITE UR QL STRIP: NEGATIVE
PH UR STRIP: 5.5 [PH] (ref 5–9)
PLATELET, FLUORESCENCE: 305 K/UL (ref 130–450)
PMV BLD AUTO: ABNORMAL FL (ref 7–12)
POSITIVE QC PASS/FAIL: NORMAL
POTASSIUM SERPL-SCNC: 4.6 MMOL/L (ref 3.5–5)
PROT SERPL-MCNC: 8.2 G/DL (ref 6.4–8.3)
PROT UR STRIP-MCNC: NEGATIVE MG/DL
RBC # BLD AUTO: 6.06 M/UL (ref 3.5–5.5)
RBC #/AREA URNS HPF: ABNORMAL /HPF
SODIUM SERPL-SCNC: 137 MMOL/L (ref 132–146)
SP GR UR STRIP: >1.03 (ref 1–1.03)
UROBILINOGEN UR STRIP-ACNC: 0.2 EU/DL (ref 0–1)
WBC #/AREA URNS HPF: ABNORMAL /HPF
WBC OTHER # BLD: 13.3 K/UL (ref 4.5–11.5)

## 2024-01-29 PROCEDURE — 2580000003 HC RX 258

## 2024-01-29 PROCEDURE — 80053 COMPREHEN METABOLIC PANEL: CPT

## 2024-01-29 PROCEDURE — 99284 EMERGENCY DEPT VISIT MOD MDM: CPT

## 2024-01-29 PROCEDURE — 87086 URINE CULTURE/COLONY COUNT: CPT

## 2024-01-29 PROCEDURE — 83690 ASSAY OF LIPASE: CPT

## 2024-01-29 PROCEDURE — 81001 URINALYSIS AUTO W/SCOPE: CPT

## 2024-01-29 PROCEDURE — 96375 TX/PRO/DX INJ NEW DRUG ADDON: CPT

## 2024-01-29 PROCEDURE — 74176 CT ABD & PELVIS W/O CONTRAST: CPT

## 2024-01-29 PROCEDURE — 83605 ASSAY OF LACTIC ACID: CPT

## 2024-01-29 PROCEDURE — A4216 STERILE WATER/SALINE, 10 ML: HCPCS

## 2024-01-29 PROCEDURE — 96374 THER/PROPH/DIAG INJ IV PUSH: CPT

## 2024-01-29 PROCEDURE — 85025 COMPLETE CBC W/AUTO DIFF WBC: CPT

## 2024-01-29 PROCEDURE — 6360000002 HC RX W HCPCS

## 2024-01-29 PROCEDURE — 2500000003 HC RX 250 WO HCPCS

## 2024-01-29 RX ORDER — CEFDINIR 300 MG/1
300 CAPSULE ORAL 2 TIMES DAILY
Qty: 14 CAPSULE | Refills: 0 | Status: SHIPPED | OUTPATIENT
Start: 2024-01-29 | End: 2024-02-05

## 2024-01-29 RX ORDER — ONDANSETRON 4 MG/1
4 TABLET, FILM COATED ORAL 3 TIMES DAILY PRN
Qty: 15 TABLET | Refills: 0 | Status: SHIPPED | OUTPATIENT
Start: 2024-01-29

## 2024-01-29 RX ORDER — ONDANSETRON 2 MG/ML
4 INJECTION INTRAMUSCULAR; INTRAVENOUS ONCE
Status: COMPLETED | OUTPATIENT
Start: 2024-01-29 | End: 2024-01-29

## 2024-01-29 RX ORDER — IBUPROFEN 600 MG/1
600 TABLET ORAL 4 TIMES DAILY PRN
Qty: 40 TABLET | Refills: 0 | Status: SHIPPED | OUTPATIENT
Start: 2024-01-29

## 2024-01-29 RX ORDER — KETOROLAC TROMETHAMINE 30 MG/ML
30 INJECTION, SOLUTION INTRAMUSCULAR; INTRAVENOUS ONCE
Status: COMPLETED | OUTPATIENT
Start: 2024-01-29 | End: 2024-01-29

## 2024-01-29 RX ORDER — MORPHINE SULFATE 4 MG/ML
4 INJECTION, SOLUTION INTRAMUSCULAR; INTRAVENOUS ONCE
Status: DISCONTINUED | OUTPATIENT
Start: 2024-01-29 | End: 2024-01-29 | Stop reason: HOSPADM

## 2024-01-29 RX ORDER — 0.9 % SODIUM CHLORIDE 0.9 %
1000 INTRAVENOUS SOLUTION INTRAVENOUS ONCE
Status: COMPLETED | OUTPATIENT
Start: 2024-01-29 | End: 2024-01-29

## 2024-01-29 RX ADMIN — KETOROLAC TROMETHAMINE 30 MG: 30 INJECTION, SOLUTION INTRAMUSCULAR; INTRAVENOUS at 14:58

## 2024-01-29 RX ADMIN — ONDANSETRON 4 MG: 2 INJECTION INTRAMUSCULAR; INTRAVENOUS at 14:58

## 2024-01-29 RX ADMIN — FAMOTIDINE 20 MG: 10 INJECTION, SOLUTION INTRAVENOUS at 14:59

## 2024-01-29 RX ADMIN — SODIUM CHLORIDE 1000 ML: 9 INJECTION, SOLUTION INTRAVENOUS at 14:33

## 2024-01-29 ASSESSMENT — PAIN SCALES - GENERAL: PAINLEVEL_OUTOF10: 6

## 2024-01-29 ASSESSMENT — PAIN DESCRIPTION - LOCATION: LOCATION: ABDOMEN;FLANK

## 2024-01-29 ASSESSMENT — PAIN DESCRIPTION - ORIENTATION: ORIENTATION: RIGHT;LOWER

## 2024-01-29 NOTE — ED PROVIDER NOTES
Independent [facility] Visit       [facility]  Department of Emergency Medicine   ED  Encounter Note  Admit Date/RoomTime: 2024  6:52 PM  ED Room: [place] [address]    NAME: Natalie Arora  : 1995  MRN: [id number]     Chief Complaint:  Abdominal Pain (Upon wakening this morning with nausea and vomiting. )    History of Present Illness   History provided by the patient.     Natalie Arora is a 28 y.o. old morbidly obese female with a history of GERD, essential hypertension, thyromegaly, ADHD, and cholecystectomy who presents to the emergency department by private vehicle, for complaints of aching, sharp pain in the right flank without radiation which began several hour(s) prior to arrival.  She states she awakened with the pain this morning, but had a \"stomach ache and did not feel the greatest\" last night.  Since onset the pain has been coming in waves.  Pain is not tearing or ripping in nature. There has been no similar episodes in the past.   Since onset the symptoms have been waxing and waning.  The pain is associated with sweating, nausea, vomiting, and dark stool.  The pain is aggravated by nothing in particular and relieved by nothing. There has been NO chest pain, shortness of breath, fever, chills, diarrhea, constipation, blood in stool, blood in emesis, cloudy urine, urinary frequency, dysuria, hematuria, urinary urgency, vaginal discharge, or vaginal bleeding.  She is a former cigarette smoker, does occasionally smoke marijuana, and is a social EtOH drinker.  She last had a shot of liquor about a week ago.    ROS   Pertinent positives and negatives are stated within HPI, all other systems reviewed and are negative.    Past Medical History:  has a past medical history of ADHD (attention deficit hyperactivity disorder), Hypertension, MDRO (multiple drug resistant organisms) resistance, Obesity, and Thyromegaly.    Surgical History:  has a past surgical history that includes

## 2024-01-30 NOTE — PROGRESS NOTES
[facility]  Internal Medicine Residency Program  ACC Note    Post-Discharge Transitional Care Management Progress Note      Natalie Arora   YOB: 1995    Date of Office Visit:  1/31/2024  Date of Hospital Admission: 1/29/2024  Date of Hospital Discharge: 1/20/2024    Care management risk score Rising risk (score 2-5) and Complex Care (Scores >=6): No Risk Score On File     Non face to face  following discharge, date last encounter closed (first attempt may have been earlier): *No documented post hospital discharge outreach found in the last 14 days *No documented post hospital discharge outreach found in the last 14 days    Call initiated 2 business days of discharge: *No response recorded in the last 14 days    ASSESSMENT/PLAN:   Below is the assessment and plan developed based on review of pertinent history, physical exam, labs, studies, and medications.  Elevated blood pressure reading  Assessment & Plan:     Orders:  -     Misc. Devices KIT; DAILY PRN Starting Wed 1/31/2024, Disp-1 kit, R-0, Normalblood pressure kit  Hospital discharge follow-up  -     IA DISCHARGE MEDS RECONCILED W/ CURRENT OUTPATIENT MED LIST  Class 3 severe obesity due to excess calories without serious comorbidity with body mass index (BMI) of 40.0 to 44.9 in adult (HCC)  -     Vickey Mauricio MD, B[facility]  Microcytosis  -     Iron and TIBC; Future  -     Ferritin; Future  -     Miscellaneous Sendout; Future      Medical Decision Making: low complexity  Return in about 2 weeks (around 2/14/2024) for BP check.    On this date 1/31/2024 I have spent 35 minutes reviewing previous notes, test results and face to face with the patient discussing the diagnosis and importance of compliance with the treatment plan as well as documenting on the day of the visit.     Subjective:   HPI:  Follow up of Hospital problems/diagnosis(es):   The patient presents for a

## 2024-01-31 ENCOUNTER — OFFICE VISIT (OUTPATIENT)
Dept: INTERNAL MEDICINE | Age: 29
End: 2024-01-31
Payer: COMMERCIAL

## 2024-01-31 VITALS
DIASTOLIC BLOOD PRESSURE: 71 MMHG | SYSTOLIC BLOOD PRESSURE: 126 MMHG | TEMPERATURE: 97.3 F | OXYGEN SATURATION: 97 % | RESPIRATION RATE: 20 BRPM | BODY MASS INDEX: 49.9 KG/M2 | WEIGHT: 292.3 LBS | HEART RATE: 87 BPM | HEIGHT: 64 IN

## 2024-01-31 DIAGNOSIS — E66.01 CLASS 3 SEVERE OBESITY DUE TO EXCESS CALORIES WITHOUT SERIOUS COMORBIDITY WITH BODY MASS INDEX (BMI) OF 40.0 TO 44.9 IN ADULT (HCC): ICD-10-CM

## 2024-01-31 DIAGNOSIS — R03.0 ELEVATED BLOOD PRESSURE READING: Primary | ICD-10-CM

## 2024-01-31 DIAGNOSIS — Z09 HOSPITAL DISCHARGE FOLLOW-UP: ICD-10-CM

## 2024-01-31 DIAGNOSIS — R71.8 MICROCYTOSIS: ICD-10-CM

## 2024-01-31 LAB
MICROORGANISM SPEC CULT: ABNORMAL
MICROORGANISM SPEC CULT: ABNORMAL
SPECIMEN DESCRIPTION: ABNORMAL

## 2024-01-31 PROCEDURE — 1111F DSCHRG MED/CURRENT MED MERGE: CPT | Performed by: INTERNAL MEDICINE

## 2024-01-31 PROCEDURE — 99213 OFFICE O/P EST LOW 20 MIN: CPT | Performed by: INTERNAL MEDICINE

## 2024-01-31 PROCEDURE — 99212 OFFICE O/P EST SF 10 MIN: CPT | Performed by: INTERNAL MEDICINE

## 2024-01-31 RX ORDER — AMLODIPINE BESYLATE 5 MG/1
5 TABLET ORAL DAILY
Qty: 30 TABLET | Refills: 0 | Status: CANCELLED | OUTPATIENT
Start: 2024-01-31

## 2024-01-31 ASSESSMENT — PATIENT HEALTH QUESTIONNAIRE - PHQ9
2. FEELING DOWN, DEPRESSED OR HOPELESS: 0
SUM OF ALL RESPONSES TO PHQ QUESTIONS 1-9: 0
SUM OF ALL RESPONSES TO PHQ9 QUESTIONS 1 & 2: 0
SUM OF ALL RESPONSES TO PHQ QUESTIONS 1-9: 0
1. LITTLE INTEREST OR PLEASURE IN DOING THINGS: 0

## 2024-01-31 NOTE — PATIENT INSTRUCTIONS
Internal Medicine Resident Service    Activity as tolerated  Diet: common adult, low fat, and low sodium  Be compliant with your medications and take them as prescribed.    Special Instructions:     No medication have been prescribed this visit.   A blood pressure log has been provided this visit. Please record your blood  pressure prior to the next clinic visit  I have ordered a blood pressure monitor kit. Please pick it up as soon as it becomes available   No blood pressure medication ordered this visit. This will be re-evaluated at your next clinic visit   Labs have been order at this visit, please get them done prior to next clinic visit.   Continue to exercise to maintain good health       Hospital Follow up:   Other Follow-Ups:    No future appointments.    If a referral was placed on your behalf during your visit, you should expect to receive information about the date and time of your referral appointment within 7-10 days. If not, please call the office at [phone] and ask to speak to the .    Should you have further questions in regards to this visit, you can review your clinical note and after visit summary document on your [employer] [facility] account.  Other questions can be directed to our nurse line at [phone].     Discharge instruction reviewed with Patient. Patient verbalizes understanding. Script given.      Arnav Arvizu MD PGY-3  1/31/2024  8:57 AM

## 2024-01-31 NOTE — PROGRESS NOTES
[facility]  Internal Medicine Residency Clinic    Attending Physician Statement  I have discussed the case, including pertinent history and exam findings with the resident physician.  I agree with the assessment, plan and orders as documented by the resident. I have reviewed all pertinent PMHx, PSHx, FamHx, SocialHx, medications, and allergies and updated history as appropriate.    Patient here for routine follow up of medical problems.     Uncomplicated UTI   -abdominal pain and urinary tract symptoms have resolved; evaluated in ED   -continue current treatment     Microcytic Anemia   -patient has had microcytic anemia and leukocytosis for many years   -MCV 65-67;   -plan for iron evaluation and hemoglobin electrophoresis 2/2 concerns for thalassemia     Obesity   -referral to obesity clinic for evlauation     Remainder of medical problems as per resident note.    Ifeanyi Underwood Jr, DO  1/31/2024 8:40 AM

## 2024-02-14 ENCOUNTER — HOSPITAL ENCOUNTER (OUTPATIENT)
Age: 29
Discharge: HOME | End: 2024-02-14
Payer: COMMERCIAL

## 2024-02-14 DIAGNOSIS — R71.8 MICROCYTOSIS: ICD-10-CM

## 2024-02-14 LAB
ALBUMIN SERPL-MCNC: 4.4 G/DL (ref 3.5–5.2)
ALP SERPL-CCNC: 74 U/L (ref 35–104)
ALT SERPL-CCNC: 17 U/L (ref 0–32)
ANION GAP SERPL CALCULATED.3IONS-SCNC: 10 MMOL/L (ref 7–16)
AST SERPL-CCNC: 24 U/L (ref 0–31)
BASOPHILS # BLD: 0.07 K/UL (ref 0–0.2)
BASOPHILS NFR BLD: 1 % (ref 0–2)
BILIRUB SERPL-MCNC: 0.4 MG/DL (ref 0–1.2)
BUN SERPL-MCNC: 8 MG/DL (ref 6–20)
CALCIUM SERPL-MCNC: 9 MG/DL (ref 8.6–10.2)
CHLORIDE SERPL-SCNC: 103 MMOL/L (ref 98–107)
CO2 SERPL-SCNC: 22 MMOL/L (ref 22–29)
CREAT SERPL-MCNC: 0.8 MG/DL (ref 0.5–1)
EOSINOPHIL # BLD: 0.32 K/UL (ref 0.05–0.5)
EOSINOPHILS RELATIVE PERCENT: 3 % (ref 0–6)
ERYTHROCYTE [DISTWIDTH] IN BLOOD BY AUTOMATED COUNT: 18.2 % (ref 11.5–15)
FERRITIN SERPL-MCNC: 29 NG/ML
GFR SERPL CREATININE-BSD FRML MDRD: >60 ML/MIN/1.73M2
GLUCOSE SERPL-MCNC: 92 MG/DL (ref 74–99)
HCT VFR BLD AUTO: 38.7 % (ref 34–48)
HGB BLD-MCNC: 11.7 G/DL (ref 11.5–15.5)
IMM GRANULOCYTES # BLD AUTO: <0.03 K/UL (ref 0–0.58)
IMM GRANULOCYTES NFR BLD: 0 % (ref 0–5)
IRON SATN MFR SERPL: 16 % (ref 15–50)
IRON SERPL-MCNC: 57 UG/DL (ref 37–145)
LYMPHOCYTES NFR BLD: 2.74 K/UL (ref 1.5–4)
LYMPHOCYTES RELATIVE PERCENT: 27 % (ref 20–42)
MCH RBC QN AUTO: 20.1 PG (ref 26–35)
MCHC RBC AUTO-ENTMCNC: 30.2 G/DL (ref 32–34.5)
MCV RBC AUTO: 66.5 FL (ref 80–99.9)
MONOCYTES NFR BLD: 0.48 K/UL (ref 0.1–0.95)
MONOCYTES NFR BLD: 5 % (ref 2–12)
NEUTROPHILS NFR BLD: 65 % (ref 43–80)
NEUTS SEG NFR BLD: 6.61 K/UL (ref 1.8–7.3)
PLATELET, FLUORESCENCE: 289 K/UL (ref 130–450)
PMV BLD AUTO: 10.9 FL (ref 7–12)
POTASSIUM SERPL-SCNC: 4.3 MMOL/L (ref 3.5–5)
PROT SERPL-MCNC: 7.9 G/DL (ref 6.4–8.3)
RBC # BLD AUTO: 5.82 M/UL (ref 3.5–5.5)
SODIUM SERPL-SCNC: 135 MMOL/L (ref 132–146)
TIBC SERPL-MCNC: 346 UG/DL (ref 250–450)
TSH SERPL DL<=0.05 MIU/L-ACNC: 1.46 UIU/ML (ref 0.27–4.2)
WBC OTHER # BLD: 10.2 K/UL (ref 4.5–11.5)

## 2024-02-14 PROCEDURE — 85025 COMPLETE CBC W/AUTO DIFF WBC: CPT

## 2024-02-14 PROCEDURE — 83020 HEMOGLOBIN ELECTROPHORESIS: CPT

## 2024-02-14 PROCEDURE — 36415 COLL VENOUS BLD VENIPUNCTURE: CPT

## 2024-02-14 PROCEDURE — 83540 ASSAY OF IRON: CPT

## 2024-02-14 PROCEDURE — 80053 COMPREHEN METABOLIC PANEL: CPT

## 2024-02-14 PROCEDURE — 83550 IRON BINDING TEST: CPT

## 2024-02-14 PROCEDURE — 84443 ASSAY THYROID STIM HORMONE: CPT

## 2024-02-14 PROCEDURE — 82728 ASSAY OF FERRITIN: CPT

## 2024-02-19 LAB
HGB ELECTROPHORESIS INTERP: NORMAL
PATHOLOGIST: NORMAL

## 2024-02-22 ENCOUNTER — TELEPHONE (OUTPATIENT)
Dept: BARIATRICS/WEIGHT MGMT | Age: 29
End: 2024-02-22

## 2024-10-09 ENCOUNTER — HOSPITAL ENCOUNTER (EMERGENCY)
Age: 29
Discharge: HOME | End: 2024-10-09
Attending: EMERGENCY MEDICINE
Payer: COMMERCIAL

## 2024-10-09 VITALS
RESPIRATION RATE: 27 BRPM | TEMPERATURE: 97.8 F | SYSTOLIC BLOOD PRESSURE: 139 MMHG | DIASTOLIC BLOOD PRESSURE: 94 MMHG | OXYGEN SATURATION: 97 % | HEART RATE: 75 BPM

## 2024-10-09 DIAGNOSIS — R55 SYNCOPE AND COLLAPSE: Primary | ICD-10-CM

## 2024-10-09 LAB
ABO + RH BLD: NORMAL
ALBUMIN SERPL-MCNC: 3.6 G/DL (ref 3.5–5.2)
ALP SERPL-CCNC: 60 U/L (ref 35–104)
ALT SERPL-CCNC: 48 U/L (ref 0–32)
ANION GAP SERPL CALCULATED.3IONS-SCNC: 11 MMOL/L (ref 7–16)
ARM BAND NUMBER: NORMAL
AST SERPL-CCNC: 28 U/L (ref 0–31)
BASOPHILS # BLD: 0.02 K/UL (ref 0–0.2)
BASOPHILS NFR BLD: 0 % (ref 0–2)
BILIRUB SERPL-MCNC: 0.2 MG/DL (ref 0–1.2)
BILIRUB UR QL STRIP: NEGATIVE
BLOOD BANK SAMPLE EXPIRATION: NORMAL
BLOOD GROUP ANTIBODIES SERPL: NEGATIVE
BUN SERPL-MCNC: 5 MG/DL (ref 6–20)
CALCIUM SERPL-MCNC: 8.8 MG/DL (ref 8.6–10.2)
CHLORIDE SERPL-SCNC: 105 MMOL/L (ref 98–107)
CLARITY UR: CLEAR
CO2 SERPL-SCNC: 21 MMOL/L (ref 22–29)
COLOR UR: YELLOW
CREAT SERPL-MCNC: 0.6 MG/DL (ref 0.5–1)
EKG ATRIAL RATE: 81 BPM
EKG P AXIS: 31 DEGREES
EKG P-R INTERVAL: 130 MS
EKG Q-T INTERVAL: 386 MS
EKG QRS DURATION: 80 MS
EKG QTC CALCULATION (BAZETT): 448 MS
EKG R AXIS: 23 DEGREES
EKG T AXIS: 8 DEGREES
EKG VENTRICULAR RATE: 81 BPM
EOSINOPHIL # BLD: 0.08 K/UL (ref 0.05–0.5)
EOSINOPHILS RELATIVE PERCENT: 1 % (ref 0–6)
ERYTHROCYTE [DISTWIDTH] IN BLOOD BY AUTOMATED COUNT: 17.1 % (ref 11.5–15)
GFR, ESTIMATED: >90 ML/MIN/1.73M2
GLUCOSE SERPL-MCNC: 110 MG/DL (ref 74–99)
GLUCOSE UR STRIP-MCNC: NEGATIVE MG/DL
HCT VFR BLD AUTO: 34.5 % (ref 34–48)
HGB BLD-MCNC: 10.6 G/DL (ref 11.5–15.5)
HGB UR QL STRIP.AUTO: NEGATIVE
IMM GRANULOCYTES # BLD AUTO: <0.03 K/UL (ref 0–0.58)
IMM GRANULOCYTES NFR BLD: 0 % (ref 0–5)
KETONES UR STRIP-MCNC: NEGATIVE MG/DL
LEUKOCYTE ESTERASE UR QL STRIP: NEGATIVE
LIPASE SERPL-CCNC: 65 U/L (ref 13–60)
LYMPHOCYTES NFR BLD: 2.22 K/UL (ref 1.5–4)
LYMPHOCYTES RELATIVE PERCENT: 25 % (ref 20–42)
MCH RBC QN AUTO: 20.1 PG (ref 26–35)
MCHC RBC AUTO-ENTMCNC: 30.7 G/DL (ref 32–34.5)
MCV RBC AUTO: 65.5 FL (ref 80–99.9)
MONOCYTES NFR BLD: 0.54 K/UL (ref 0.1–0.95)
MONOCYTES NFR BLD: 6 % (ref 2–12)
NEUTROPHILS NFR BLD: 68 % (ref 43–80)
NEUTS SEG NFR BLD: 6.11 K/UL (ref 1.8–7.3)
NITRITE UR QL STRIP: NEGATIVE
PH UR STRIP: 7 [PH] (ref 5–9)
PLATELET, FLUORESCENCE: 287 K/UL (ref 130–450)
PMV BLD AUTO: 10.5 FL (ref 7–12)
POTASSIUM SERPL-SCNC: 4.4 MMOL/L (ref 3.5–5)
PROT SERPL-MCNC: 6.8 G/DL (ref 6.4–8.3)
PROT UR STRIP-MCNC: NEGATIVE MG/DL
RBC # BLD AUTO: 5.27 M/UL (ref 3.5–5.5)
RBC #/AREA URNS HPF: NORMAL /HPF
SODIUM SERPL-SCNC: 137 MMOL/L (ref 132–146)
SP GR UR STRIP: 1.01 (ref 1–1.03)
UROBILINOGEN UR STRIP-ACNC: 0.2 EU/DL (ref 0–1)
WBC #/AREA URNS HPF: NORMAL /HPF
WBC OTHER # BLD: 9 K/UL (ref 4.5–11.5)

## 2024-10-09 PROCEDURE — 80053 COMPREHEN METABOLIC PANEL: CPT

## 2024-10-09 PROCEDURE — 99284 EMERGENCY DEPT VISIT MOD MDM: CPT

## 2024-10-09 PROCEDURE — 86901 BLOOD TYPING SEROLOGIC RH(D): CPT

## 2024-10-09 PROCEDURE — 81001 URINALYSIS AUTO W/SCOPE: CPT

## 2024-10-09 PROCEDURE — 83690 ASSAY OF LIPASE: CPT

## 2024-10-09 PROCEDURE — 85025 COMPLETE CBC W/AUTO DIFF WBC: CPT

## 2024-10-09 PROCEDURE — 86900 BLOOD TYPING SEROLOGIC ABO: CPT

## 2024-10-09 PROCEDURE — 93005 ELECTROCARDIOGRAM TRACING: CPT

## 2024-10-09 PROCEDURE — 86850 RBC ANTIBODY SCREEN: CPT

## 2024-10-09 PROCEDURE — 2580000003 HC RX 258

## 2024-10-09 PROCEDURE — 93010 ELECTROCARDIOGRAM REPORT: CPT | Performed by: INTERNAL MEDICINE

## 2024-10-09 RX ORDER — 0.9 % SODIUM CHLORIDE 0.9 %
1000 INTRAVENOUS SOLUTION INTRAVENOUS ONCE
Status: COMPLETED | OUTPATIENT
Start: 2024-10-09 | End: 2024-10-09

## 2024-10-09 RX ADMIN — SODIUM CHLORIDE 1000 ML: 9 INJECTION, SOLUTION INTRAVENOUS at 10:31

## 2024-10-09 NOTE — ED PROVIDER NOTES
Reassessment, disposition considerations/shared decision making with patient, consults, disposition:      ED Course as of 10/09/24 1218   Wed Oct 09, 2024   1123 No proteinuria [DT]      ED Course User Index  [DT] Abhishek Garcia DO        Patient presents to the emergency department for a syncopal event while she was getting out of bed, she felt her vision get a little blurry and then synopsized for a few seconds.  Patient is asymptomatic at time of arrival.  This is her second pregnancy being 15 weeks pregnant, no difficulties with the first pregnancy.  She does experience of intermittent nausea and vomiting, none at this time. Differential diagnosis includes anemia, vasovagal syncope, UTI to name a few.  Labs show no leukocytosis or anemia.  Nausea disturbance or acute kidney injury.  No UTI.  No ketones or protein in the urine.  On reevaluation, patient reports he is feeling much better after drinking adequate fluids and got a bolus in the emergency department.  Patient be discharged to follow-up with her PCP outpatient.      CONSULTS: (Who and What was discussed)  None        FINAL IMPRESSION      1. Syncope and collapse          DISPOSITION/PLAN     DISPOSITION Decision To Discharge 10/09/2024 12:17:04 PM    DISPOSITION  Disposition: Discharge to home  Patient condition is stable    10/9/24, 11:25 AM EDT.    Abhishek Garcia DO  Emergency Medicine    PATIENT REFERRED TO:  Caren Gonzalez MD  [address] [place] [address]  [phone]    Call in 1 day  for follow up      DISCHARGE MEDICATIONS:  New Prescriptions    No medications on file       DISCONTINUED MEDICATIONS:  Discontinued Medications    No medications on file              (Please note that portions of this note were completed with a voice recognition program.  Efforts were made to edit the dictations but occasionally words are mis-transcribed.)    Abhishek Garcia DO (electronically signed)

## 2024-11-07 LAB
BASOPHILS ABSOLUTE: 21 CELLS/UL (ref 0–200)
BASOPHILS RELATIVE PERCENT: 0.2 %
COMMENT: ABNORMAL
EOSINOPHILS ABSOLUTE: 127 CELLS/UL (ref 15–500)
EOSINOPHILS RELATIVE PERCENT: 1.2 %
GLUCOSE CHALLENGE: 123 MG/DL
HCT VFR BLD CALC: 34 % (ref 35–45)
HEMOGLOBIN: 10.2 G/DL (ref 11.7–15.5)
LYMPHOCYTES ABSOLUTE: 2194 CELLS/UL (ref 850–3900)
LYMPHOCYTES RELATIVE PERCENT: 20.7 %
MCH RBC QN AUTO: 20.8 PG (ref 27–33)
MCHC RBC AUTO-ENTMCNC: 30 G/DL (ref 32–36)
MCV RBC AUTO: 69.2 FL (ref 80–100)
MONOCYTES ABSOLUTE: 424 CELLS/UL (ref 200–950)
MONOCYTES RELATIVE PERCENT: 4 %
NEUTROPHILS ABSOLUTE: 7833 CELLS/UL (ref 1500–7800)
NEUTROPHILS RELATIVE PERCENT: 73.9 %
PDW BLD-RTO: 18.5 % (ref 11–15)
PLATELET # BLD: 353 THOUSAND/UL (ref 140–400)
PMV BLD AUTO: ABNORMAL FL (ref 7.5–12.5)
RBC # BLD: 4.91 MILLION/UL (ref 3.8–5.1)
WBC # BLD: 10.6 THOUSAND/UL (ref 3.8–10.8)

## 2024-11-14 ENCOUNTER — TELEMEDICINE ON DEMAND (OUTPATIENT)
Age: 29
End: 2024-11-14
Payer: COMMERCIAL

## 2024-11-14 DIAGNOSIS — J01.90 ACUTE BACTERIAL SINUSITIS: Primary | ICD-10-CM

## 2024-11-14 DIAGNOSIS — B96.89 ACUTE BACTERIAL SINUSITIS: Primary | ICD-10-CM

## 2024-11-14 PROCEDURE — 99213 OFFICE O/P EST LOW 20 MIN: CPT | Performed by: NURSE PRACTITIONER

## 2024-11-14 RX ORDER — AMOXICILLIN 500 MG/1
500 CAPSULE ORAL 2 TIMES DAILY
Qty: 20 CAPSULE | Refills: 0 | Status: SHIPPED | OUTPATIENT
Start: 2024-11-14 | End: 2024-11-24

## 2024-11-14 ASSESSMENT — ENCOUNTER SYMPTOMS
SORE THROAT: 1
VOMITING: 1
COUGH: 1
SINUS PRESSURE: 1

## 2024-11-14 NOTE — PROGRESS NOTES
Natalie Arora (:  1995) is a Established patient, here for evaluation of the following:    Cold Symptoms (X2 weeks, nasal congestion, fatigue, post nasal drip, vomiting )       Assessment & Plan:  Below is the assessment and plan developed based on review of pertinent history, physical exam, labs, studies, and medications.  1. Acute bacterial sinusitis  Hot tea, cough drops, push fluids  -     amoxicillin (AMOXIL) 500 MG capsule; Take 1 capsule by mouth 2 times daily for 10 days, Disp-20 capsule, R-0Normal    No follow-ups on file.  The patient would benefit from future follow up with their usual PCP. As of the end of their Virtualist Visit today, follow up visit status is as follows: Patient to follow up as previously instructed by PCP    Subjective:   Sinus Pain  Patient complains of congestion, cough, nasal congestion, post nasal drip, purulent rhinorrhea, sinus pressure, sore throat, and vomiting mucus 2-3 times daily. Onset of symptoms was 10 days ago. Symptoms have been unchanged since that time. She is drinking plenty of fluids. Patient is 20 weeks pregnant. Patient is smoker. Sending in amoxicillin. Patient to notify OB id vomiting persists, push fluids.        Review of Systems   Constitutional:  Positive for fatigue.   HENT:  Positive for congestion, postnasal drip, sinus pressure and sore throat.         Nasal secretions, yellow to cloudy   Respiratory:  Positive for cough.         Dry   Gastrointestinal:  Positive for vomiting.       Objective:  Patient-Reported Vitals  Patient-Reported Systolic (Top): 120 mmHg  Patient-Reported Diastolic (Bottom): 80 mmHg  Patient-Reported Weight: 277  Patient-Reported Height: 5’4           2024     9:39 AM   Patient-Reported Vitals   Patient-Reported Weight 277   Patient-Reported Height 5’4   Patient-Reported Systolic 120 mmHg   Patient-Reported Diastolic 80 mmHg        Physical Exam:  [INSTRUCTIONS:  \"[x]\" Indicates a positive item  \"[]\" Indicates a

## 2024-12-10 ENCOUNTER — HOSPITAL ENCOUNTER (OUTPATIENT)
Age: 29
Discharge: HOME | End: 2024-12-10
Attending: OBSTETRICS & GYNECOLOGY | Admitting: OBSTETRICS & GYNECOLOGY
Payer: COMMERCIAL

## 2024-12-10 VITALS
HEART RATE: 73 BPM | SYSTOLIC BLOOD PRESSURE: 103 MMHG | DIASTOLIC BLOOD PRESSURE: 53 MMHG | RESPIRATION RATE: 16 BRPM | TEMPERATURE: 97.8 F | OXYGEN SATURATION: 100 %

## 2024-12-10 PROCEDURE — 59025 FETAL NON-STRESS TEST: CPT

## 2024-12-10 PROCEDURE — 59025 FETAL NON-STRESS TEST: CPT | Performed by: NURSE PRACTITIONER

## 2024-12-11 NOTE — PROGRESS NOTES
OFFICE PROGRESS NOTE:    Patient:  Natalie Arora     Today's Date: 12/10/2024    S: For assessment of NST.    O:   Vitals:    12/10/24 2203   BP: (!) 103/53   Pulse: 73   Resp: 16   Temp: 97.8 °F (36.6 °C)   TempSrc: Oral   SpO2: 100%       NST: reactive  Baseline FHR: 140 BPM  Baseline FHR Variability: moderate in degree  Accelerations: present  Periodic or Episodic Decelerations: Present with mild variable  Changes or Trends of FHR pattern over time:   Frequency and intensity of uterine contractions: absent       Uterus: Gravid, NT, FH equal to dates  Cervix:   Not Examined  Extremities: - C/C/Edema: Trace    A:29 y.o.  female at 24w3d  for NST due to decreased fetal movement   pregnancy <37 weeks     Patient Active Problem List   Diagnosis    Class 3 severe obesity due to excess calories without serious comorbidity with body mass index (BMI) of 40.0 to 44.9 in adult    Gastroesophageal reflux disease    Elevated blood pressure reading     NST results: reactive    P: tracing reviewed with house officer  Discharge home with instructions and precautions.  Fetal movement counts daily.  Keep next appointment as scheduled.    VARGAS Mchugh - CNP  12/10/2024 11:03 PM

## 2025-02-28 ENCOUNTER — HOSPITAL ENCOUNTER (OUTPATIENT)
Age: 30
Discharge: HOME | End: 2025-02-28
Attending: OBSTETRICS & GYNECOLOGY | Admitting: OBSTETRICS & GYNECOLOGY
Payer: COMMERCIAL

## 2025-02-28 VITALS
TEMPERATURE: 98.2 F | DIASTOLIC BLOOD PRESSURE: 66 MMHG | RESPIRATION RATE: 14 BRPM | HEART RATE: 96 BPM | SYSTOLIC BLOOD PRESSURE: 125 MMHG

## 2025-02-28 VITALS
SYSTOLIC BLOOD PRESSURE: 120 MMHG | RESPIRATION RATE: 16 BRPM | TEMPERATURE: 98 F | HEART RATE: 90 BPM | DIASTOLIC BLOOD PRESSURE: 59 MMHG

## 2025-02-28 PROBLEM — O26.90 COMPLICATED PREGNANCY, UNSPECIFIED TRIMESTER: Status: ACTIVE | Noted: 2025-02-28

## 2025-02-28 LAB
ALBUMIN SERPL-MCNC: 3.4 G/DL (ref 3.5–5.2)
ALP SERPL-CCNC: 122 U/L (ref 35–104)
ALT SERPL-CCNC: 8 U/L (ref 0–32)
ANION GAP SERPL CALCULATED.3IONS-SCNC: 9 MMOL/L (ref 7–16)
AST SERPL-CCNC: 18 U/L (ref 0–31)
BACTERIA URNS QL MICRO: ABNORMAL
BASOPHILS # BLD: 0.03 K/UL (ref 0–0.2)
BASOPHILS NFR BLD: 0 % (ref 0–2)
BILIRUB SERPL-MCNC: <0.2 MG/DL (ref 0–1.2)
BILIRUB UR QL STRIP: NEGATIVE
BUN SERPL-MCNC: 8 MG/DL (ref 6–20)
CALCIUM SERPL-MCNC: 9.2 MG/DL (ref 8.6–10.2)
CHLORIDE SERPL-SCNC: 106 MMOL/L (ref 98–107)
CLARITY UR: ABNORMAL
CO2 SERPL-SCNC: 23 MMOL/L (ref 22–29)
COLOR UR: YELLOW
CREAT SERPL-MCNC: 0.6 MG/DL (ref 0.5–1)
CREAT UR-MCNC: 217.3 MG/DL (ref 29–226)
CRYSTALS URNS MICRO: ABNORMAL /HPF
EOSINOPHIL # BLD: 0.13 K/UL (ref 0.05–0.5)
EOSINOPHILS RELATIVE PERCENT: 1 % (ref 0–6)
EPI CELLS #/AREA URNS HPF: ABNORMAL /HPF
ERYTHROCYTE [DISTWIDTH] IN BLOOD BY AUTOMATED COUNT: 16.8 % (ref 11.5–15)
GFR, ESTIMATED: >90 ML/MIN/1.73M2
GLUCOSE SERPL-MCNC: 83 MG/DL (ref 74–99)
GLUCOSE UR STRIP-MCNC: NEGATIVE MG/DL
HCT VFR BLD AUTO: 30.7 % (ref 34–48)
HGB BLD-MCNC: 9.1 G/DL (ref 11.5–15.5)
HGB UR QL STRIP.AUTO: NEGATIVE
IMM GRANULOCYTES # BLD AUTO: 0.05 K/UL (ref 0–0.58)
IMM GRANULOCYTES NFR BLD: 1 % (ref 0–5)
KETONES UR STRIP-MCNC: ABNORMAL MG/DL
LEUKOCYTE ESTERASE UR QL STRIP: NEGATIVE
LYMPHOCYTES NFR BLD: 2.08 K/UL (ref 1.5–4)
LYMPHOCYTES RELATIVE PERCENT: 20 % (ref 20–42)
MCH RBC QN AUTO: 19.4 PG (ref 26–35)
MCHC RBC AUTO-ENTMCNC: 29.6 G/DL (ref 32–34.5)
MCV RBC AUTO: 65.5 FL (ref 80–99.9)
MONOCYTES NFR BLD: 0.65 K/UL (ref 0.1–0.95)
MONOCYTES NFR BLD: 6 % (ref 2–12)
MUCOUS THREADS URNS QL MICRO: PRESENT
NEUTROPHILS NFR BLD: 72 % (ref 43–80)
NEUTS SEG NFR BLD: 7.39 K/UL (ref 1.8–7.3)
NITRITE UR QL STRIP: NEGATIVE
PH UR STRIP: 6 [PH] (ref 5–8)
PLATELET, FLUORESCENCE: 234 K/UL (ref 130–450)
PMV BLD AUTO: ABNORMAL FL (ref 7–12)
POTASSIUM SERPL-SCNC: 4.1 MMOL/L (ref 3.5–5)
PROT SERPL-MCNC: 6.4 G/DL (ref 6.4–8.3)
PROT UR STRIP-MCNC: NEGATIVE MG/DL
RBC # BLD AUTO: 4.69 M/UL (ref 3.5–5.5)
RBC #/AREA URNS HPF: ABNORMAL /HPF
SODIUM SERPL-SCNC: 138 MMOL/L (ref 132–146)
SP GR UR STRIP: >1.03 (ref 1–1.03)
TOTAL PROTEIN, URINE: 21 MG/DL (ref 0–12)
URINE TOTAL PROTEIN CREATININE RATIO: 0.1 (ref 0–0.2)
UROBILINOGEN UR STRIP-ACNC: 0.2 EU/DL (ref 0–1)
WBC #/AREA URNS HPF: ABNORMAL /HPF
WBC OTHER # BLD: 10.3 K/UL (ref 4.5–11.5)

## 2025-02-28 PROCEDURE — 81001 URINALYSIS AUTO W/SCOPE: CPT

## 2025-02-28 PROCEDURE — 80053 COMPREHEN METABOLIC PANEL: CPT

## 2025-02-28 PROCEDURE — 96360 HYDRATION IV INFUSION INIT: CPT

## 2025-02-28 PROCEDURE — 84156 ASSAY OF PROTEIN URINE: CPT

## 2025-02-28 PROCEDURE — 99202 OFFICE O/P NEW SF 15 MIN: CPT

## 2025-02-28 PROCEDURE — 99221 1ST HOSP IP/OBS SF/LOW 40: CPT | Performed by: MIDWIFE

## 2025-02-28 PROCEDURE — 82570 ASSAY OF URINE CREATININE: CPT

## 2025-02-28 PROCEDURE — 96361 HYDRATE IV INFUSION ADD-ON: CPT

## 2025-02-28 PROCEDURE — 99212 OFFICE O/P EST SF 10 MIN: CPT

## 2025-02-28 PROCEDURE — 85025 COMPLETE CBC W/AUTO DIFF WBC: CPT

## 2025-02-28 RX ORDER — SODIUM CHLORIDE, SODIUM LACTATE, POTASSIUM CHLORIDE, CALCIUM CHLORIDE 600; 310; 30; 20 MG/100ML; MG/100ML; MG/100ML; MG/100ML
INJECTION, SOLUTION INTRAVENOUS CONTINUOUS
Status: DISCONTINUED | OUTPATIENT
Start: 2025-02-28 | End: 2025-02-28 | Stop reason: HOSPADM

## 2025-02-28 NOTE — PROGRESS NOTES
Chandra JACKSON CNM updated on pt lab results, BP's, FHR and no contractions. Per Chandra JACKSON CNM pt may be discharged home.

## 2025-02-28 NOTE — PROGRESS NOTES
Pt  arrives at 35.6 weeks gestation with c/o spotty vision starting around 0130 which is starting to go away along with bilateral foot swelling which she noticed on . Pt has no obstetrical complaints at this time. Feeling baby move normally and not c/o LOF, vag bleeding or contraction pain.

## 2025-02-28 NOTE — H&P
Tobacco Use    Smoking status: Former    Smokeless tobacco: Never    Tobacco comments:     LESS THAN A PPD   Vaping Use    Vaping status: Unknown   Substance and Sexual Activity    Alcohol use: Not Currently     Comment: socially    Drug use: Yes     Types: Marijuana (Weed)     Comment: on occasion    Sexual activity: Yes     Partners: Male   Other Topics Concern    Not on file   Social History Narrative    Not on file     Social Determinants of Health     Financial Resource Strain: Low Risk  (2/9/2023)    Overall Financial Resource Strain (CARDIA)     Difficulty of Paying Living Expenses: Not very hard   Food Insecurity: Not on file (2/9/2023)   Transportation Needs: No Transportation Needs (2/9/2023)    PRAPARE - Transportation     Lack of Transportation (Medical): No     Lack of Transportation (Non-Medical): No   Physical Activity: Insufficiently Active (2/9/2023)    Exercise Vital Sign     Days of Exercise per Week: 3 days     Minutes of Exercise per Session: 20 min   Stress: Not on file   Social Connections: Not on file   Intimate Partner Violence: Not At Risk (2/9/2023)    Humiliation, Afraid, Rape, and Kick questionnaire     Fear of Current or Ex-Partner: No     Emotionally Abused: No     Physically Abused: No     Sexually Abused: No   Housing Stability: Low Risk  (2/9/2023)    Housing Stability Vital Sign     Unable to Pay for Housing in the Last Year: No     Number of Places Lived in the Last Year: 1     Unstable Housing in the Last Year: No       Family History:       Problem Relation Age of Onset    Hypertension Mother     Coronary Art Dis Father        Medications Prior to Admission:  Medications Prior to Admission: Prenatal MV-Min-Fe Fum-FA-DHA (PRENATAL+DHA PO), Take by mouth  Misc. Devices KIT, 1 kit by Does not apply route daily as needed (blood pressure kit) blood pressure kit    REVIEW OF SYSTEMS:  CONSTITUTIONAL:  negative  RESPIRATORY:  negative  CARDIOVASCULAR:  negative  GASTROINTESTINAL:   negative  ALLERGIC/IMMUNOLOGIC:  negative  NEUROLOGICAL:  negative  BEHAVIOR/PSYCH:  negative    PHYSICAL EXAM:  Vitals:    25 0255 25 0313   BP: 128/61 (!) 120/59   Pulse: 90 90   Temp: 98 °F (36.7 °C)    TempSrc: Oral      General appearance:  awake, alert, cooperative, no apparent distress, and appears stated age  Skin: warm, dry, normal color, no rash  Heart:  Regular rate & rhythm, S1 S2, no murmurs, rubs, or gallops  Lungs:  No increased work of breathing, good air exchange, CTA b/l.  Abdomen:  Soft, non tender, gravid, consistent with her gestational age,  Extremities: No clubbing, cyanosis, cords; No calf tenderness; Edema: no  Fetal heart rate:  Reassuring.  Pelvis: defer  Cervix: defer  Contraction frequency:  0 minutes  Membranes:  Intact    Labs:   No results found for this or any previous visit (from the past 72 hour(s)).    ASSESSMENT:   29 y.o.  female at 35w6d     Patient Active Problem List   Diagnosis    Class 3 severe obesity due to excess calories without serious comorbidity with body mass index (BMI) of 40.0 to 44.9 in adult    Gastroesophageal reflux disease    Elevated blood pressure reading     Fetus: Reassuring  GBS: not done    PLAN:  Discussed with Dr. Garcia.  Discharge to home    VARGAS Groves CNM, LJ  25 3:16 AM EST

## 2025-02-28 NOTE — DISCHARGE INSTRUCTIONS
Home Undelivered Discharge Instructions    After Discharge Orders:    Future Appointments   Date Time Provider Department Center   3/25/2025 10:15 AM Caren Gonzalez MD [facility] DEP                 Diet:  normal diet as tolerated    Rest: normal activity as tolerated    Other instructions: Do kick counts once a day on your baby. Choose the time of day your baby is most active. Get in a comfortable lying or sitting position and time how long it takes to feel 10 kicks, twists, turns, swishes, or rolls. Call your physician or midwife if there have not been 10 kicks in 2 hours    Call physician or midwife, return to Labor and Delivery, call 911, or go to the nearest Emergency Room if: increased leakage or fluid, decreased fetal movement, persistent low back pain or cramping, bleeding from vaginal area, difficulty urinating, pain with urination, difficulty breathing, new calf pain, persistent headache, or vision change

## 2025-02-28 NOTE — DISCHARGE INSTRUCTIONS
Home Undelivered Discharge Instructions    After Discharge Orders:    Future Appointments   Date Time Provider Department Center   3/25/2025 10:15 AM Caren Gonzalez MD [facility] DEP       Call physician for follow up instructions.              Diet:  normal diet as tolerated    Rest: normal activity as tolerated    Other instructions: Do kick counts once a day on your baby. Choose the time of day your baby is most active. Get in a comfortable lying or sitting position and time how long it takes to feel 10 kicks, twists, turns, swishes, or rolls. Call your physician or midwife if there have not been 10 kicks in 2 hours    Call physician or midwife, return to Labor and Delivery, call 911, or go to the nearest Emergency Room if: increased leakage or fluid, contractions more than  6 per  1 hour, decreased fetal movement, persistent low back pain or cramping, bleeding from vaginal area, difficulty urinating, pain with urination, difficulty breathing, new calf pain, persistent headache, or vision change

## 2025-02-28 NOTE — PROGRESS NOTES
35w6d SUAD 3/29/25 ambulatory to the L&D unit with complaints of swelling in bilateral legs that has gotten worse in the past 2 days. EFM applied. Maternal perception of fetal movement noted. Pt denies any bleeding or leaking fluids. Pt denies any contractions or pain. Call light within reach.

## 2025-02-28 NOTE — PROGRESS NOTES
Received Discharge instructions. No further questions. Patient ambulated off the the unit with support person at this time

## 2025-03-05 ENCOUNTER — HOSPITAL ENCOUNTER (OUTPATIENT)
Age: 30
Discharge: HOME | End: 2025-03-05
Attending: OBSTETRICS & GYNECOLOGY | Admitting: OBSTETRICS & GYNECOLOGY
Payer: COMMERCIAL

## 2025-03-05 ENCOUNTER — APPOINTMENT (OUTPATIENT)
Dept: ULTRASOUND IMAGING | Age: 30
End: 2025-03-05
Payer: COMMERCIAL

## 2025-03-05 VITALS
RESPIRATION RATE: 20 BRPM | WEIGHT: 293 LBS | HEIGHT: 64 IN | SYSTOLIC BLOOD PRESSURE: 128 MMHG | TEMPERATURE: 97.8 F | BODY MASS INDEX: 50.02 KG/M2 | HEART RATE: 98 BPM | DIASTOLIC BLOOD PRESSURE: 76 MMHG

## 2025-03-05 PROBLEM — O26.93 COMPLICATION OF PREGNANCY IN THIRD TRIMESTER: Status: ACTIVE | Noted: 2025-03-05

## 2025-03-05 PROCEDURE — 99212 OFFICE O/P EST SF 10 MIN: CPT

## 2025-03-05 PROCEDURE — 76815 OB US LIMITED FETUS(S): CPT

## 2025-03-05 RX ORDER — ACETAMINOPHEN 500 MG
1000 TABLET ORAL EVERY 6 HOURS PRN
Status: DISCONTINUED | OUTPATIENT
Start: 2025-03-05 | End: 2025-03-05 | Stop reason: HOSPADM

## 2025-03-05 NOTE — PROGRESS NOTES
36w4d  Patient arrived ambulatory to unit with c/o falling on ABD on concrete.  Patient stated she tripped over wooden planks in the garage.  Placed in triage and put on EFM for tracing.  Call light in reach.  Patient denies any other concerns but states she bruised left knee and states she does not need to have it evaluated at this time.

## 2025-03-07 LAB
CHLAMYDIA, NUC. ACID AMP: NOT DETECTED
COMMENT: NORMAL
HB: SOURCE: NORMAL
N GONORRHOEAE AMPLIFIED DET: NOT DETECTED
STREP GROUP B NAA: NOT DETECTED
TRICHOMONAS VAGINALIS RRNA: NOT DETECTED

## 2025-03-13 ENCOUNTER — APPOINTMENT (OUTPATIENT)
Dept: ULTRASOUND IMAGING | Age: 30
End: 2025-03-13
Payer: COMMERCIAL

## 2025-03-13 ENCOUNTER — HOSPITAL ENCOUNTER (OUTPATIENT)
Age: 30
Setting detail: OBSERVATION
Discharge: HOME | End: 2025-03-13
Attending: OBSTETRICS & GYNECOLOGY | Admitting: OBSTETRICS & GYNECOLOGY
Payer: COMMERCIAL

## 2025-03-13 VITALS
TEMPERATURE: 98.1 F | RESPIRATION RATE: 16 BRPM | HEART RATE: 96 BPM | SYSTOLIC BLOOD PRESSURE: 117 MMHG | DIASTOLIC BLOOD PRESSURE: 63 MMHG

## 2025-03-13 PROBLEM — O36.8190 DECREASED FETAL MOVEMENT AFFECTING MANAGEMENT OF MOTHER, ANTEPARTUM: Status: ACTIVE | Noted: 2025-03-13

## 2025-03-13 PROCEDURE — 76819 FETAL BIOPHYS PROFIL W/O NST: CPT

## 2025-03-13 PROCEDURE — 99211 OFF/OP EST MAY X REQ PHY/QHP: CPT

## 2025-03-13 PROCEDURE — 6370000000 HC RX 637 (ALT 250 FOR IP): Performed by: OBSTETRICS & GYNECOLOGY

## 2025-03-13 PROCEDURE — 96374 THER/PROPH/DIAG INJ IV PUSH: CPT

## 2025-03-13 PROCEDURE — G0378 HOSPITAL OBSERVATION PER HR: HCPCS

## 2025-03-13 RX ORDER — ONDANSETRON 2 MG/ML
4 INJECTION INTRAMUSCULAR; INTRAVENOUS EVERY 6 HOURS PRN
Status: DISCONTINUED | OUTPATIENT
Start: 2025-03-13 | End: 2025-03-14 | Stop reason: HOSPADM

## 2025-03-13 RX ORDER — ACETAMINOPHEN 500 MG
1000 TABLET ORAL EVERY 8 HOURS SCHEDULED
Status: DISCONTINUED | OUTPATIENT
Start: 2025-03-13 | End: 2025-03-14 | Stop reason: HOSPADM

## 2025-03-13 RX ORDER — ONDANSETRON 4 MG/1
4 TABLET, ORALLY DISINTEGRATING ORAL EVERY 8 HOURS PRN
Status: DISCONTINUED | OUTPATIENT
Start: 2025-03-13 | End: 2025-03-14 | Stop reason: HOSPADM

## 2025-03-13 RX ADMIN — ACETAMINOPHEN 1000 MG: 500 TABLET ORAL at 21:30

## 2025-03-13 ASSESSMENT — PAIN SCALES - GENERAL
PAINLEVEL_OUTOF10: 6
PAINLEVEL_OUTOF10: 6

## 2025-03-13 NOTE — PROGRESS NOTES
37w5d SUAD 3/29/25 ambulatory to the L&D unit with complaints of decreased FM, ABD/back pain, pelvic pressure. EFM applied. Maternal perception of fetal movement noted. Pt denies any bleeding or leaking fluids. Pt states pain is 5/10. Call light within reach.

## 2025-03-14 NOTE — DISCHARGE INSTRUCTIONS
Home Undelivered Discharge Instructions    After Discharge Orders:    Future Appointments   Date Time Provider Department Center   3/25/2025 10:15 AM Caren Gonzalez MD [facility] DEP                     Diet:  normal diet as tolerated    Rest: normal activity as tolerated    Other instructions: Do kick counts once a day on your baby. Choose the time of day your baby is most active. Get in a comfortable lying or sitting position and time how long it takes to feel 10 kicks, twists, turns, swishes, or rolls. Call your physician or midwife if there have not been 10 kicks in 1 hours    Call physician or midwife, return to Labor and Delivery, call 911, or go to the nearest Emergency Room if: increased leakage or fluid, contractions more than  10 per  30 minutes, decreased fetal movement, persistent low back pain or cramping, bleeding from vaginal area, difficulty urinating, pain with urination, difficulty breathing, new calf pain, persistent headache, or vision change

## 2025-03-14 NOTE — H&P
Department of Obstetrics and Gynecology  Attending Obstetrics History and Physical        CHIEF COMPLAINT:  decreased fetal movement    HISTORY OF PRESENT ILLNESS:      The patient is a 29 y.o.  4 parity 1 at 37.5 weeks.  Patient presents with a chief complaint as above and is being admitted for decreased fetal movement.  Patient reports since she has been here normal fetal movement.      OB History    Para Term  AB Living   4 1 1  2 1   SAB IAB Ectopic Molar Multiple Live Births       0 1      # Outcome Date GA Lbr Gio/2nd Weight Sex Type Anes PTL Lv   4 Current            3 Term 11/15/20 40w0d 07:30 / 02:49 3.43 kg (7 lb 9 oz) M Vag-Spont EPI, Local N RUDY   2 AB 2018           1 AB 2016             Past Medical History:        Diagnosis Date    ADHD (attention deficit hyperactivity disorder)     DR BURNHAM    Hypertension     MDRO (multiple drug resistant organisms) resistance     Obesity     Thyromegaly      Past Surgical History:        Procedure Laterality Date    CHOLECYSTECTOMY, LAPAROSCOPIC  2017     Social History:    TOBACCO:   reports that she has quit smoking. She has never used smokeless tobacco.  ETOH:   reports that she does not currently use alcohol.  DRUGS:   reports current drug use. Drug: Marijuana (Weed).  Family History:       Problem Relation Age of Onset    Hypertension Mother     Coronary Art Dis Father      Medications Prior to Admission:  Medications Prior to Admission: Prenatal MV-Min-Fe Fum-FA-DHA (PRENATAL+DHA PO), Take by mouth  Misc. Devices KIT, 1 kit by Does not apply route daily as needed (blood pressure kit) blood pressure kit  Allergies:  Banana, Nickel, and Shellfish-derived products    REVIEW OF SYSTEMS:    Patient has no history of depression.  Patient has no symptoms of depression      PHYSICAL EXAM:    General appearance:  awake, alert, cooperative, no apparent distress, and appears stated age  Neurologic:  Gravid soft non-tender  Lungs:  No

## 2025-03-14 NOTE — PROGRESS NOTES
Discharge instructions given, patient verbalizes understanding with no further questions at this time

## 2025-03-20 ENCOUNTER — HOSPITAL ENCOUNTER (OUTPATIENT)
Age: 30
Discharge: HOME | End: 2025-03-20
Attending: OBSTETRICS & GYNECOLOGY | Admitting: OBSTETRICS & GYNECOLOGY
Payer: COMMERCIAL

## 2025-03-20 VITALS
HEART RATE: 103 BPM | RESPIRATION RATE: 18 BRPM | OXYGEN SATURATION: 99 % | DIASTOLIC BLOOD PRESSURE: 65 MMHG | TEMPERATURE: 98.6 F | SYSTOLIC BLOOD PRESSURE: 137 MMHG

## 2025-03-20 PROBLEM — Z34.93 NORMAL PREGNANCY, THIRD TRIMESTER: Status: ACTIVE | Noted: 2025-03-20

## 2025-03-20 LAB
ALBUMIN SERPL-MCNC: 3.3 G/DL (ref 3.5–5.2)
ALP SERPL-CCNC: 124 U/L (ref 35–104)
ALT SERPL-CCNC: 9 U/L (ref 0–32)
ANION GAP SERPL CALCULATED.3IONS-SCNC: 12 MMOL/L (ref 7–16)
AST SERPL-CCNC: 19 U/L (ref 0–31)
BACTERIA URNS QL MICRO: ABNORMAL
BASOPHILS # BLD: 0 K/UL (ref 0–0.2)
BASOPHILS NFR BLD: 0 % (ref 0–2)
BILIRUB SERPL-MCNC: 0.2 MG/DL (ref 0–1.2)
BILIRUB UR QL STRIP: NEGATIVE
BUN SERPL-MCNC: 10 MG/DL (ref 6–20)
CALCIUM SERPL-MCNC: 9.2 MG/DL (ref 8.6–10.2)
CHLORIDE SERPL-SCNC: 104 MMOL/L (ref 98–107)
CLARITY UR: CLEAR
CO2 SERPL-SCNC: 20 MMOL/L (ref 22–29)
COLOR UR: YELLOW
CREAT SERPL-MCNC: 0.7 MG/DL (ref 0.5–1)
CREAT UR-MCNC: 310.6 MG/DL (ref 29–226)
EOSINOPHIL # BLD: 0.1 K/UL (ref 0.05–0.5)
EOSINOPHILS RELATIVE PERCENT: 1 % (ref 0–6)
EPI CELLS #/AREA URNS HPF: ABNORMAL /HPF
ERYTHROCYTE [DISTWIDTH] IN BLOOD BY AUTOMATED COUNT: 17.9 % (ref 11.5–15)
GFR, ESTIMATED: >90 ML/MIN/1.73M2
GLUCOSE SERPL-MCNC: 112 MG/DL (ref 74–99)
GLUCOSE UR STRIP-MCNC: NEGATIVE MG/DL
HCT VFR BLD AUTO: 29 % (ref 34–48)
HGB BLD-MCNC: 8.7 G/DL (ref 11.5–15.5)
HGB UR QL STRIP.AUTO: NEGATIVE
KETONES UR STRIP-MCNC: NEGATIVE MG/DL
LEUKOCYTE ESTERASE UR QL STRIP: NEGATIVE
LYMPHOCYTES NFR BLD: 2.51 K/UL (ref 1.5–4)
LYMPHOCYTES RELATIVE PERCENT: 23 % (ref 20–42)
MCH RBC QN AUTO: 18.9 PG (ref 26–35)
MCHC RBC AUTO-ENTMCNC: 30 G/DL (ref 32–34.5)
MCV RBC AUTO: 63 FL (ref 80–99.9)
MONOCYTES NFR BLD: 0.19 K/UL (ref 0.1–0.95)
MONOCYTES NFR BLD: 2 % (ref 2–12)
NEUTROPHILS NFR BLD: 75 % (ref 43–80)
NEUTS SEG NFR BLD: 8.2 K/UL (ref 1.8–7.3)
NITRITE UR QL STRIP: POSITIVE
PH UR STRIP: 6 [PH] (ref 5–8)
PLATELET, FLUORESCENCE: 225 K/UL (ref 130–450)
PMV BLD AUTO: ABNORMAL FL (ref 7–12)
POTASSIUM SERPL-SCNC: 4 MMOL/L (ref 3.5–5)
PROT SERPL-MCNC: 6.4 G/DL (ref 6.4–8.3)
PROT UR STRIP-MCNC: 30 MG/DL
RBC # BLD AUTO: 4.6 M/UL (ref 3.5–5.5)
RBC # BLD: ABNORMAL 10*6/UL
RBC #/AREA URNS HPF: ABNORMAL /HPF
SODIUM SERPL-SCNC: 136 MMOL/L (ref 132–146)
SP GR UR STRIP: >1.03 (ref 1–1.03)
TOTAL PROTEIN, URINE: 49 MG/DL (ref 0–12)
URINE TOTAL PROTEIN CREATININE RATIO: 0.16 (ref 0–0.2)
UROBILINOGEN UR STRIP-ACNC: 0.2 EU/DL (ref 0–1)
WBC #/AREA URNS HPF: ABNORMAL /HPF
WBC OTHER # BLD: 11 K/UL (ref 4.5–11.5)

## 2025-03-20 PROCEDURE — 82570 ASSAY OF URINE CREATININE: CPT

## 2025-03-20 PROCEDURE — 84156 ASSAY OF PROTEIN URINE: CPT

## 2025-03-20 PROCEDURE — 85025 COMPLETE CBC W/AUTO DIFF WBC: CPT

## 2025-03-20 PROCEDURE — 99211 OFF/OP EST MAY X REQ PHY/QHP: CPT

## 2025-03-20 PROCEDURE — 80053 COMPREHEN METABOLIC PANEL: CPT

## 2025-03-20 PROCEDURE — 81001 URINALYSIS AUTO W/SCOPE: CPT

## 2025-03-20 NOTE — PROGRESS NOTES
NERIS Artis in department. Vitals and all labs reviewed/ strip reviewed. New order received for discharge to home.Discharge instructions given. Verbalizes understanding.

## 2025-03-20 NOTE — PROGRESS NOTES
GP: 4/1     EDC: 38w 5d    Patient arrived ambulatory to the Labor and Delivery unit with complaints of (   htn in office, patient reports seeing spots blurry vision and headache, edema in extremities.  Contractions have stopped.            )   EFM applied and fetal well being established. Patient assessed and information obtained   about health and history. Patient oriented to room and call light.   NERIS Artis notified. Orders received. Plan of care discussed with patient. Patient verbalizes understanding.

## 2025-03-20 NOTE — DISCHARGE INSTRUCTIONS
Home Undelivered Discharge Instructions    After Discharge Orders: INDUCTION MONDAY 3/24/25 @ 7am    Future Appointments   Date Time Provider Department Center   3/25/2025 10:15 AM Caren Gonzalez MD [facility] DEP       Call physician or midwife's office on  for instructions.              Diet:  normal diet as tolerated    Rest: normal activity as tolerated    Other instructions: Do kick counts once a day on your baby. Choose the time of day your baby is most active. Get in a comfortable lying or sitting position and time how long it takes to feel 10 kicks, twists, turns, swishes, or rolls. Call your physician or midwife if there have not been 10 kicks in 1 hours    Call physician or midwife, return to Labor and Delivery, call 911, or go to the nearest Emergency Room if: increased leakage or fluid, contractions more than  12 per  1 hour, decreased fetal movement, persistent low back pain or cramping, bleeding from vaginal area, difficulty urinating, pain with urination, difficulty breathing, new calf pain, persistent headache, or vision change

## 2025-03-24 ENCOUNTER — HOSPITAL ENCOUNTER (INPATIENT)
Age: 30
LOS: 2 days | Discharge: HOME | End: 2025-03-26
Attending: OBSTETRICS & GYNECOLOGY | Admitting: OBSTETRICS & GYNECOLOGY
Payer: COMMERCIAL

## 2025-03-24 ENCOUNTER — ANESTHESIA (OUTPATIENT)
Dept: LABOR AND DELIVERY | Age: 30
End: 2025-03-24
Payer: COMMERCIAL

## 2025-03-24 ENCOUNTER — ANESTHESIA EVENT (OUTPATIENT)
Dept: LABOR AND DELIVERY | Age: 30
End: 2025-03-24
Payer: COMMERCIAL

## 2025-03-24 ENCOUNTER — APPOINTMENT (OUTPATIENT)
Dept: LABOR AND DELIVERY | Age: 30
End: 2025-03-24
Payer: COMMERCIAL

## 2025-03-24 PROBLEM — Z34.93 NORMAL PREGNANCY IN THIRD TRIMESTER: Status: ACTIVE | Noted: 2025-03-24

## 2025-03-24 LAB
ABO + RH BLD: NORMAL
ALBUMIN SERPL-MCNC: 3.6 G/DL (ref 3.5–5.2)
ALP SERPL-CCNC: 149 U/L (ref 35–104)
ALT SERPL-CCNC: 9 U/L (ref 0–32)
AMPHET UR QL SCN: NEGATIVE
ANION GAP SERPL CALCULATED.3IONS-SCNC: 10 MMOL/L (ref 7–16)
ARM BAND NUMBER: NORMAL
AST SERPL-CCNC: 21 U/L (ref 0–31)
BARBITURATES UR QL SCN: NEGATIVE
BASOPHILS # BLD: 0 K/UL (ref 0–0.2)
BASOPHILS NFR BLD: 0 % (ref 0–2)
BENZODIAZ UR QL: NEGATIVE
BILIRUB SERPL-MCNC: <0.2 MG/DL (ref 0–1.2)
BLOOD BANK SAMPLE EXPIRATION: NORMAL
BLOOD GROUP ANTIBODIES SERPL: NEGATIVE
BUN SERPL-MCNC: 7 MG/DL (ref 6–20)
BUPRENORPHINE UR QL: NEGATIVE
CALCIUM SERPL-MCNC: 8.8 MG/DL (ref 8.6–10.2)
CANNABINOIDS UR QL SCN: NEGATIVE
CHLORIDE SERPL-SCNC: 106 MMOL/L (ref 98–107)
CO2 SERPL-SCNC: 20 MMOL/L (ref 22–29)
COCAINE UR QL SCN: NEGATIVE
CREAT SERPL-MCNC: 0.6 MG/DL (ref 0.5–1)
EOSINOPHIL # BLD: 0.19 K/UL (ref 0.05–0.5)
EOSINOPHILS RELATIVE PERCENT: 2 % (ref 0–6)
ERYTHROCYTE [DISTWIDTH] IN BLOOD BY AUTOMATED COUNT: 18 % (ref 11.5–15)
FENTANYL UR QL: NEGATIVE
GFR, ESTIMATED: >90 ML/MIN/1.73M2
GLUCOSE SERPL-MCNC: 96 MG/DL (ref 74–99)
HCT VFR BLD AUTO: 31.5 % (ref 34–48)
HGB BLD-MCNC: 9.3 G/DL (ref 11.5–15.5)
LYMPHOCYTES NFR BLD: 2.97 K/UL (ref 1.5–4)
LYMPHOCYTES RELATIVE PERCENT: 27 % (ref 20–42)
MCH RBC QN AUTO: 18.9 PG (ref 26–35)
MCHC RBC AUTO-ENTMCNC: 29.5 G/DL (ref 32–34.5)
MCV RBC AUTO: 64 FL (ref 80–99.9)
METHADONE UR QL: NEGATIVE
MONOCYTES NFR BLD: 0.38 K/UL (ref 0.1–0.95)
MONOCYTES NFR BLD: 4 % (ref 2–12)
NEUTROPHILS NFR BLD: 68 % (ref 43–80)
NEUTS SEG NFR BLD: 7.46 K/UL (ref 1.8–7.3)
NUCLEATED RED BLOOD CELLS: 1 PER 100 WBC
OPIATES UR QL SCN: NEGATIVE
OXYCODONE UR QL SCN: NEGATIVE
PCP UR QL SCN: NEGATIVE
PLATELET, FLUORESCENCE: 220 K/UL (ref 130–450)
PMV BLD AUTO: ABNORMAL FL (ref 7–12)
POTASSIUM SERPL-SCNC: 3.9 MMOL/L (ref 3.5–5)
PROT SERPL-MCNC: 7.1 G/DL (ref 6.4–8.3)
RBC # BLD AUTO: 4.92 M/UL (ref 3.5–5.5)
RBC # BLD: ABNORMAL 10*6/UL
SODIUM SERPL-SCNC: 136 MMOL/L (ref 132–146)
TEST INFORMATION: NORMAL
WBC OTHER # BLD: 11 K/UL (ref 4.5–11.5)

## 2025-03-24 PROCEDURE — 80053 COMPREHEN METABOLIC PANEL: CPT

## 2025-03-24 PROCEDURE — 6360000002 HC RX W HCPCS: Performed by: OBSTETRICS & GYNECOLOGY

## 2025-03-24 PROCEDURE — 3700000025 EPIDURAL BLOCK: Performed by: ANESTHESIOLOGY

## 2025-03-24 PROCEDURE — 86900 BLOOD TYPING SEROLOGIC ABO: CPT

## 2025-03-24 PROCEDURE — 80307 DRUG TEST PRSMV CHEM ANLYZR: CPT

## 2025-03-24 PROCEDURE — 1220000001 HC SEMI PRIVATE L&D R&B

## 2025-03-24 PROCEDURE — 2500000003 HC RX 250 WO HCPCS: Performed by: ANESTHESIOLOGY

## 2025-03-24 PROCEDURE — 86592 SYPHILIS TEST NON-TREP QUAL: CPT

## 2025-03-24 PROCEDURE — 86850 RBC ANTIBODY SCREEN: CPT

## 2025-03-24 PROCEDURE — 85025 COMPLETE CBC W/AUTO DIFF WBC: CPT

## 2025-03-24 PROCEDURE — 86901 BLOOD TYPING SEROLOGIC RH(D): CPT

## 2025-03-24 PROCEDURE — 2580000003 HC RX 258: Performed by: OBSTETRICS & GYNECOLOGY

## 2025-03-24 PROCEDURE — 7200000001 HC VAGINAL DELIVERY

## 2025-03-24 RX ORDER — NALBUPHINE HYDROCHLORIDE 10 MG/ML
10 INJECTION INTRAMUSCULAR; INTRAVENOUS; SUBCUTANEOUS
Status: COMPLETED | OUTPATIENT
Start: 2025-03-24 | End: 2025-03-24

## 2025-03-24 RX ORDER — SODIUM CHLORIDE, SODIUM LACTATE, POTASSIUM CHLORIDE, AND CALCIUM CHLORIDE .6; .31; .03; .02 G/100ML; G/100ML; G/100ML; G/100ML
500 INJECTION, SOLUTION INTRAVENOUS PRN
Status: DISCONTINUED | OUTPATIENT
Start: 2025-03-24 | End: 2025-03-25

## 2025-03-24 RX ORDER — EPHEDRINE SULFATE/0.9% NACL/PF 25 MG/5 ML
10 SYRINGE (ML) INTRAVENOUS PRN
Status: DISCONTINUED | OUTPATIENT
Start: 2025-03-24 | End: 2025-03-25

## 2025-03-24 RX ORDER — METHYLERGONOVINE MALEATE 0.2 MG/ML
200 INJECTION INTRAVENOUS PRN
Status: DISCONTINUED | OUTPATIENT
Start: 2025-03-24 | End: 2025-03-25

## 2025-03-24 RX ORDER — SODIUM CHLORIDE 0.9 % (FLUSH) 0.9 %
5-40 SYRINGE (ML) INJECTION EVERY 12 HOURS SCHEDULED
Status: DISCONTINUED | OUTPATIENT
Start: 2025-03-24 | End: 2025-03-25

## 2025-03-24 RX ORDER — CARBOPROST TROMETHAMINE 250 UG/ML
250 INJECTION, SOLUTION INTRAMUSCULAR PRN
Status: DISCONTINUED | OUTPATIENT
Start: 2025-03-24 | End: 2025-03-25

## 2025-03-24 RX ORDER — SODIUM CHLORIDE 0.9 % (FLUSH) 0.9 %
5-40 SYRINGE (ML) INJECTION PRN
Status: DISCONTINUED | OUTPATIENT
Start: 2025-03-24 | End: 2025-03-25

## 2025-03-24 RX ORDER — ONDANSETRON 2 MG/ML
4 INJECTION INTRAMUSCULAR; INTRAVENOUS EVERY 6 HOURS PRN
Status: DISCONTINUED | OUTPATIENT
Start: 2025-03-24 | End: 2025-03-25

## 2025-03-24 RX ORDER — SODIUM CHLORIDE, SODIUM LACTATE, POTASSIUM CHLORIDE, CALCIUM CHLORIDE 600; 310; 30; 20 MG/100ML; MG/100ML; MG/100ML; MG/100ML
INJECTION, SOLUTION INTRAVENOUS CONTINUOUS
Status: DISCONTINUED | OUTPATIENT
Start: 2025-03-24 | End: 2025-03-25

## 2025-03-24 RX ORDER — TRANEXAMIC ACID 10 MG/ML
1000 INJECTION, SOLUTION INTRAVENOUS
Status: DISCONTINUED | OUTPATIENT
Start: 2025-03-24 | End: 2025-03-25

## 2025-03-24 RX ORDER — TERBUTALINE SULFATE 1 MG/ML
0.25 INJECTION SUBCUTANEOUS
Status: DISCONTINUED | OUTPATIENT
Start: 2025-03-24 | End: 2025-03-25

## 2025-03-24 RX ORDER — NALOXONE HYDROCHLORIDE 0.4 MG/ML
INJECTION, SOLUTION INTRAMUSCULAR; INTRAVENOUS; SUBCUTANEOUS PRN
Status: DISCONTINUED | OUTPATIENT
Start: 2025-03-24 | End: 2025-03-25

## 2025-03-24 RX ORDER — LIDOCAINE HYDROCHLORIDE 10 MG/ML
INJECTION, SOLUTION INFILTRATION; PERINEURAL
Status: DISCONTINUED
Start: 2025-03-24 | End: 2025-03-25

## 2025-03-24 RX ORDER — SODIUM CHLORIDE 9 MG/ML
25 INJECTION, SOLUTION INTRAVENOUS PRN
Status: DISCONTINUED | OUTPATIENT
Start: 2025-03-24 | End: 2025-03-25

## 2025-03-24 RX ORDER — ONDANSETRON 4 MG/1
4 TABLET, ORALLY DISINTEGRATING ORAL EVERY 8 HOURS PRN
Status: DISCONTINUED | OUTPATIENT
Start: 2025-03-24 | End: 2025-03-25

## 2025-03-24 RX ORDER — MISOPROSTOL 200 UG/1
400 TABLET ORAL PRN
Status: DISCONTINUED | OUTPATIENT
Start: 2025-03-24 | End: 2025-03-25

## 2025-03-24 RX ADMIN — SODIUM CHLORIDE, POTASSIUM CHLORIDE, SODIUM LACTATE AND CALCIUM CHLORIDE: 600; 310; 30; 20 INJECTION, SOLUTION INTRAVENOUS at 11:45

## 2025-03-24 RX ADMIN — SODIUM CHLORIDE, POTASSIUM CHLORIDE, SODIUM LACTATE AND CALCIUM CHLORIDE: 600; 310; 30; 20 INJECTION, SOLUTION INTRAVENOUS at 07:34

## 2025-03-24 RX ADMIN — Medication 2 MILLI-UNITS/MIN: at 08:00

## 2025-03-24 RX ADMIN — Medication 5 ML: at 18:38

## 2025-03-24 RX ADMIN — NALBUPHINE HYDROCHLORIDE 10 MG: 10 INJECTION, SOLUTION INTRAMUSCULAR; INTRAVENOUS; SUBCUTANEOUS at 13:54

## 2025-03-24 RX ADMIN — NALBUPHINE HYDROCHLORIDE 10 MG: 10 INJECTION, SOLUTION INTRAMUSCULAR; INTRAVENOUS; SUBCUTANEOUS at 17:12

## 2025-03-24 RX ADMIN — Medication 10 ML: at 18:31

## 2025-03-24 RX ADMIN — Medication 15 ML/HR: at 18:38

## 2025-03-24 RX ADMIN — SODIUM CHLORIDE, SODIUM LACTATE, POTASSIUM CHLORIDE, AND CALCIUM CHLORIDE 500 ML: .6; .31; .03; .02 INJECTION, SOLUTION INTRAVENOUS at 13:38

## 2025-03-24 ASSESSMENT — PAIN - FUNCTIONAL ASSESSMENT: PAIN_FUNCTIONAL_ASSESSMENT: ACTIVITIES ARE NOT PREVENTED

## 2025-03-24 ASSESSMENT — PAIN DESCRIPTION - DESCRIPTORS: DESCRIPTORS: CRAMPING

## 2025-03-24 ASSESSMENT — PAIN DESCRIPTION - LOCATION
LOCATION: ABDOMEN
LOCATION: ABDOMEN

## 2025-03-24 ASSESSMENT — PAIN SCALES - GENERAL
PAINLEVEL_OUTOF10: 8
PAINLEVEL_OUTOF10: 6

## 2025-03-24 ASSESSMENT — PAIN DESCRIPTION - ORIENTATION: ORIENTATION: LOWER;MID

## 2025-03-24 ASSESSMENT — LIFESTYLE VARIABLES: SMOKING_STATUS: 1

## 2025-03-24 NOTE — ANESTHESIA PRE PROCEDURE
Department of Anesthesiology  Preprocedure Note       Name:  Natalie Arora   Age:  29 y.o.  :  1995                                          MRN:  [id number]         Date:  3/24/2025      Surgeon: ANGELIQUE    Procedure: LABOR ANALGESIA    Medications prior to admission:   Prior to Admission medications    Medication Sig Start Date End Date Taking? Authorizing Provider   Prenatal MV-Min-Fe Fum-FA-DHA (PRENATAL+DHA PO) Take by mouth   Yes Provider, Historical, MD   Misc. Devices KIT 1 kit by Does not apply route daily as needed (blood pressure kit) blood pressure kit  Patient not taking: Reported on 3/24/2025 1/31/24   Arnav Arvizu MD       Current medications:    Current Facility-Administered Medications   Medication Dose Route Frequency Provider Last Rate Last Admin    lactated ringers infusion   IntraVENous Continuous Derick Garcia  mL/hr at 25 0734 New Bag at 25 0734    lactated ringers bolus 500 mL  500 mL IntraVENous PRN Derick Garcia MD        Or    lactated ringers bolus 500 mL  500 mL IntraVENous PRN Derick Garcia MD        sodium chloride flush 0.9 % injection 5-40 mL  5-40 mL IntraVENous 2 times per day Derick Garcia MD        sodium chloride flush 0.9 % injection 5-40 mL  5-40 mL IntraVENous PRN Derick Garcia MD        0.9 % sodium chloride infusion  25 mL IntraVENous PRN Derick Garcia MD        oxytocin (PITOCIN) 30 units in 500 mL infusion  1-20 mayur-units/min IntraVENous Continuous Derick Garcia MD 6 mL/hr at 25 0900 6 mayur-units/min at 25 0900    methylergonovine (METHERGINE) injection 200 mcg  200 mcg IntraMUSCular PRN Derick Garcia MD        carboprost (HEMABATE) injection 250 mcg  250 mcg IntraMUSCular PRN Derick Garcia MD        miSOPROStol (CYTOTEC) tablet 400 mcg  400 mcg Buccal PRN Derick Garcia MD        tranexamic acid-NaCl IVPB premix 1,000 mg  1,000 mg IntraVENous Once PRN Angelique

## 2025-03-24 NOTE — PROGRESS NOTES
Assuming care of patient. RN monitoring fetal heart rate and uterine activity every 30 minutes while patient is in labor. Maternal perception of movement noted. Plan of care discussed with patient; patient verbalizes understanding. Call light in reach.

## 2025-03-24 NOTE — ANESTHESIA PROCEDURE NOTES
Epidural Block    Patient location during procedure: OB  Start time: 3/24/2025 6:20 PM  End time: 3/24/2025 6:24 PM  Reason for block: labor epidural  Staffing  Performed: resident/CRNA   Anesthesiologist: Kasi Montiel MD  Resident/CRNA: Melissa Aragon APRN - CRNA  Performed by: Melissa Aragon APRN - CRNA  Authorized by: Melissa Aragon APRN - CRNA    Epidural  Patient position: sitting  Prep: Betadine  Patient monitoring: cardiac monitor, continuous pulse ox and frequent blood pressure checks  Approach: midline  Location: L3-4  Injection technique: THA air  Provider prep: mask, sterile gloves and sterile gown  Needle  Needle type: Tuohy   Needle gauge: 18 G  Needle length: 3.5 in  Needle insertion depth: 8 cm  Catheter type: side hole  Catheter size: 20 G.  Catheter at skin depth: 15 cm  Test dose: negativeCatheter Secured: tegaderm and tape  Assessment  Sensory level: T6  Hemodynamics: stable  Attempts: 1  Outcomes: uncomplicated and patient tolerated procedure well  Preanesthetic Checklist  Completed: patient identified, IV checked, site marked, risks and benefits discussed, surgical/procedural consents, equipment checked, pre-op evaluation, timeout performed, anesthesia consent given, oxygen available and monitors applied/VS acknowledged

## 2025-03-24 NOTE — H&P
Department of Obstetrics and Gynecology  [facility] History and Physical        CHIEF COMPLAINT: IOL       HISTORY OF PRESENT ILLNESS:          The patient is a 29 y.o. female , Patient's last menstrual period was 2023 (approximate).,  at 39w2d.  OB History          4    Para   1    Term   1            AB   2    Living   1         SAB        IAB        Ectopic        Molar        Multiple   0    Live Births   1            Patient presents for IOL.    Estimated Due Date: Estimated Date of Delivery: 3/29/25    PRENATAL CARE:    Complicated by:   Patient Active Problem List   Diagnosis Code    Class 3 severe obesity due to excess calories without serious comorbidity with body mass index (BMI) of 40.0 to 44.9 in adult E66.813, E66.01, Z68.41    Gastroesophageal reflux disease K21.9    Elevated blood pressure reading R03.0    Complicated pregnancy, unspecified trimester O26.90    Complication of pregnancy in third trimester O26.93    Decreased fetal movement affecting management of mother, antepartum O36.8190    Normal pregnancy, third trimester Z34.93    Normal pregnancy in third trimester Z34.93       PAST OB HISTORY  OB History          4    Para   1    Term   1            AB   2    Living   1         SAB        IAB        Ectopic        Molar        Multiple   0    Live Births   1                Past Medical History:        Diagnosis Date    ADHD (attention deficit hyperactivity disorder)     DR BURNHAM    Hypertension     MDRO (multiple drug resistant organisms) resistance     Obesity     Thyromegaly      Past Surgical History:        Procedure Laterality Date    CHOLECYSTECTOMY, LAPAROSCOPIC  2017     Social History:    TOBACCO:   reports that she has quit smoking. She has never used smokeless tobacco.  ETOH:   reports that she does not currently use alcohol.  DRUGS:   reports current drug use. Drug: Marijuana (Weed).  Family History:       Problem Relation Age of Onset

## 2025-03-24 NOTE — PROGRESS NOTES
Patient arrived ambulatory for scheduled IOL. Placed on EFM, toco. Oriented to LD9. Plan of care discussed with patient, patient verbalized understanding.

## 2025-03-24 NOTE — PROGRESS NOTES
Department of Obstetrics and Gynecology  Labor and Delivery   Attending Progress Note      SUBJECTIVE:  Contractions     OBJECTIVE:      Vitals:    BP (!) 143/83   Pulse 76   Temp 98.2 °F (36.8 °C) (Oral)   Resp 20   Ht 1.626 m (5' 4\")   LMP 06/23/2023 (Approximate)   SpO2 98%   BMI 53.73 kg/m²     Fetal heart rate:       Baseline Heart Rate:  145        Accelerations:  present       Long Term Variability:  moderate       Decelerations:  absent         Contraction frequency: 4 minutes    Membranes:  Ruptured clear fluid    Cervix:           Dilation:  3 cm         Effacement:  50%         Station:  -2             ASSESSMENT & PLAN:    Induction of labor  AROM and Pitocin      Derick Watt MD  3/24/2025

## 2025-03-25 LAB — RPR SER QL: NONREACTIVE

## 2025-03-25 PROCEDURE — 3E033VJ INTRODUCTION OF OTHER HORMONE INTO PERIPHERAL VEIN, PERCUTANEOUS APPROACH: ICD-10-PCS | Performed by: OBSTETRICS & GYNECOLOGY

## 2025-03-25 PROCEDURE — 1220000001 HC SEMI PRIVATE L&D R&B

## 2025-03-25 PROCEDURE — 6370000000 HC RX 637 (ALT 250 FOR IP)

## 2025-03-25 PROCEDURE — 10907ZC DRAINAGE OF AMNIOTIC FLUID, THERAPEUTIC FROM PRODUCTS OF CONCEPTION, VIA NATURAL OR ARTIFICIAL OPENING: ICD-10-PCS | Performed by: OBSTETRICS & GYNECOLOGY

## 2025-03-25 PROCEDURE — 6370000000 HC RX 637 (ALT 250 FOR IP): Performed by: OBSTETRICS & GYNECOLOGY

## 2025-03-25 RX ORDER — SODIUM CHLORIDE 9 MG/ML
INJECTION, SOLUTION INTRAVENOUS PRN
Status: DISCONTINUED | OUTPATIENT
Start: 2025-03-25 | End: 2025-03-26 | Stop reason: HOSPADM

## 2025-03-25 RX ORDER — IBUPROFEN 800 MG/1
800 TABLET, FILM COATED ORAL EVERY 8 HOURS PRN
Status: DISCONTINUED | OUTPATIENT
Start: 2025-03-25 | End: 2025-03-26 | Stop reason: HOSPADM

## 2025-03-25 RX ORDER — ACETAMINOPHEN 500 MG
1000 TABLET ORAL EVERY 8 HOURS PRN
Status: DISCONTINUED | OUTPATIENT
Start: 2025-03-25 | End: 2025-03-26 | Stop reason: HOSPADM

## 2025-03-25 RX ORDER — SODIUM CHLORIDE 0.9 % (FLUSH) 0.9 %
5-40 SYRINGE (ML) INJECTION PRN
Status: DISCONTINUED | OUTPATIENT
Start: 2025-03-25 | End: 2025-03-26 | Stop reason: HOSPADM

## 2025-03-25 RX ORDER — ONDANSETRON 2 MG/ML
4 INJECTION INTRAMUSCULAR; INTRAVENOUS EVERY 6 HOURS PRN
Status: DISCONTINUED | OUTPATIENT
Start: 2025-03-25 | End: 2025-03-26 | Stop reason: HOSPADM

## 2025-03-25 RX ORDER — ACETAMINOPHEN 500 MG
1000 TABLET ORAL EVERY 8 HOURS SCHEDULED
Status: DISCONTINUED | OUTPATIENT
Start: 2025-03-25 | End: 2025-03-25

## 2025-03-25 RX ORDER — ONDANSETRON 4 MG/1
4 TABLET, ORALLY DISINTEGRATING ORAL EVERY 6 HOURS PRN
Status: DISCONTINUED | OUTPATIENT
Start: 2025-03-25 | End: 2025-03-26 | Stop reason: HOSPADM

## 2025-03-25 RX ORDER — SODIUM CHLORIDE 0.9 % (FLUSH) 0.9 %
5-40 SYRINGE (ML) INJECTION EVERY 12 HOURS SCHEDULED
Status: DISCONTINUED | OUTPATIENT
Start: 2025-03-25 | End: 2025-03-26 | Stop reason: HOSPADM

## 2025-03-25 RX ORDER — MODIFIED LANOLIN
OINTMENT (GRAM) TOPICAL PRN
Status: DISCONTINUED | OUTPATIENT
Start: 2025-03-25 | End: 2025-03-26 | Stop reason: HOSPADM

## 2025-03-25 RX ORDER — IBUPROFEN 800 MG/1
800 TABLET, FILM COATED ORAL EVERY 8 HOURS SCHEDULED
Status: DISCONTINUED | OUTPATIENT
Start: 2025-03-25 | End: 2025-03-25

## 2025-03-25 RX ADMIN — Medication: at 01:01

## 2025-03-25 RX ADMIN — Medication: at 00:48

## 2025-03-25 RX ADMIN — IBUPROFEN 800 MG: 800 TABLET, FILM COATED ORAL at 10:27

## 2025-03-25 RX ADMIN — IBUPROFEN 800 MG: 800 TABLET, FILM COATED ORAL at 19:17

## 2025-03-25 RX ADMIN — IBUPROFEN 800 MG: 800 TABLET, FILM COATED ORAL at 03:27

## 2025-03-25 RX ADMIN — WITCH HAZEL: 500 SOLUTION RECTAL; TOPICAL at 00:43

## 2025-03-25 RX ADMIN — ACETAMINOPHEN 1000 MG: 500 TABLET ORAL at 16:50

## 2025-03-25 RX ADMIN — ACETAMINOPHEN 1000 MG: 500 TABLET ORAL at 06:22

## 2025-03-25 ASSESSMENT — PAIN SCALES - GENERAL
PAINLEVEL_OUTOF10: 5
PAINLEVEL_OUTOF10: 8
PAINLEVEL_OUTOF10: 5
PAINLEVEL_OUTOF10: 4
PAINLEVEL_OUTOF10: 8

## 2025-03-25 ASSESSMENT — PAIN DESCRIPTION - DESCRIPTORS
DESCRIPTORS: CRAMPING
DESCRIPTORS: CRAMPING;DISCOMFORT
DESCRIPTORS: CRAMPING

## 2025-03-25 ASSESSMENT — PAIN DESCRIPTION - LOCATION
LOCATION: ABDOMEN

## 2025-03-25 ASSESSMENT — PAIN DESCRIPTION - ORIENTATION
ORIENTATION: MID;LOWER
ORIENTATION: MID;LOWER

## 2025-03-25 ASSESSMENT — PAIN - FUNCTIONAL ASSESSMENT
PAIN_FUNCTIONAL_ASSESSMENT: ACTIVITIES ARE NOT PREVENTED
PAIN_FUNCTIONAL_ASSESSMENT: 0-10
PAIN_FUNCTIONAL_ASSESSMENT: ACTIVITIES ARE NOT PREVENTED
PAIN_FUNCTIONAL_ASSESSMENT: 0-10

## 2025-03-25 NOTE — PROGRESS NOTES
Pt ambulated to BR. Voided. Showered. Returned to bed. Plan of care for night discussed. Pt verbalizes understanding. Safe sleep practices reviewed and discussed. Mother verbalizes understanding of need for baby to sleep in crib. Rest encourageed

## 2025-03-25 NOTE — PROGRESS NOTES
Department of Obstetrics and Gynecology  Labor and Delivery  Post Partum Progress Note    Postpartum Day # 1    SUBJECTIVE:  Pt resting in bed, reports feeling well, denies emotional concerns. Infant feeding with out difficulty. Ambulatory per self.     OBJECTIVE:     Vitals:  BP (!) 145/83   Pulse 80   Temp 97.7 °F (36.5 °C) (Oral)   Resp 18   Ht 1.626 m (5' 4\")   LMP 06/23/2023 (Approximate)   SpO2 100%   Breastfeeding Unknown   BMI 53.73 kg/m²     LUNGS:  no increased work of breathing  CARDIOVASCULAR:  regular rate and rhythm  ABDOMEN:  soft and non tender   GENITAL/URINARY:  External Genitalia:  General appearance; normal, Hair distribution; normal, Lesions absent  Uterus: @ U  Minimal Lochia       CBC:    Lab Results   Component Value Date/Time    WBC 11.0 03/24/2025 07:30 AM    RBC 4.92 03/24/2025 07:30 AM    HGB 9.3 03/24/2025 07:30 AM    HCT 31.5 03/24/2025 07:30 AM    MCV 64.0 03/24/2025 07:30 AM    RDW 18.0 03/24/2025 07:30 AM     11/06/2024 10:56 AM       ASSESSMENT & PLAN:   PPD 1  SVB     Continue Current Care

## 2025-03-25 NOTE — PROGRESS NOTES
Recovery period completed. IV to prn adapter. PT breastfeeding baby, po foods given. No complaints of pain.

## 2025-03-25 NOTE — PLAN OF CARE
Problem: Safety - Adult  Goal: Free from fall injury  Outcome: Progressing     Problem: Pain  Goal: Verbalizes/displays adequate comfort level or baseline comfort level  Outcome: Progressing     Problem: Postpartum  Goal: Experiences normal postpartum course  Description:  Postpartum OB-Pregnancy care plan goal which identifies if the mother is experiencing a normal postpartum course  Outcome: Progressing  Goal: Appropriate maternal -  bonding  Description:  Postpartum OB-Pregnancy care plan goal which identifies if the mother and  are bonding appropriately  Outcome: Progressing  Goal: Establishment of infant feeding pattern  Description:  Postpartum OB-Pregnancy care plan goal which identifies if the mother is establishing a feeding pattern with their   Outcome: Progressing  Goal: Incisions, wounds, or drain sites healing without S/S of infection  Outcome: Progressing     Problem: Infection - Adult  Goal: Absence of infection at discharge  Outcome: Progressing  Goal: Absence of infection during hospitalization  Outcome: Progressing  Goal: Absence of fever/infection during anticipated neutropenic period  Outcome: Progressing     Problem: Discharge Planning  Goal: Discharge to home or other facility with appropriate resources  Outcome: Progressing     Problem: Chronic Conditions and Co-morbidities  Goal: Patient's chronic conditions and co-morbidity symptoms are monitored and maintained or improved  Outcome: Progressing

## 2025-03-25 NOTE — PROGRESS NOTES
Single live born female delivered vaginally at 2142. Bulb suction and tactile stimulation performed on  on mother's abdomen. Apgar's 9/9.

## 2025-03-25 NOTE — ANESTHESIA POSTPROCEDURE EVALUATION
Department of Anesthesiology  Postprocedure Note    Patient: Natalie Arora  MRN: [id number]  YOB: 1995  Date of evaluation: 3/25/2025    Procedure Summary       Date: 03/24/25 Room / Location:     Anesthesia Start: 1813 Anesthesia Stop: 2142    Procedure: Labor Analgesia Diagnosis:     Scheduled Providers:  Responsible Provider: Kasi Montiel MD    Anesthesia Type: epidural ASA Status: 3            Anesthesia Type: No value filed.    Raman Phase I:      Raman Phase II: Raman Score: 10    Anesthesia Post Evaluation    Patient location during evaluation: bedside  Patient participation: complete - patient participated  Level of consciousness: awake and alert  Airway patency: patent  Nausea & Vomiting: no nausea and no vomiting  Cardiovascular status: hemodynamically stable  Respiratory status: acceptable  Hydration status: euvolemic  Comments: Patient satisfied with epidural for labor  Pain management: adequate        No notable events documented.

## 2025-03-25 NOTE — PROGRESS NOTES
Pt medicated for complaints of cramping. Breastfeeding without difficulty. Baby to remain in room.

## 2025-03-25 NOTE — PROGRESS NOTES
Assumed care of pt for 11-7 shift. First contact with pt. Plan of care for night discussed. Pt verbalizes understanding. Safe sleep practices reviewed and discussed. Pt verbalizes understanding of need for baby to sleep in crib. Breastfeeding without difficulty.

## 2025-03-25 NOTE — L&D DELIVERY NOTE
Naomi Arora [[id number]]      Labor Events     Labor: No  Cervical Ripening Date/Time:      Antibiotics Received during Labor: No  Rupture Identifier: Sac 1  Rupture Date/Time:  3/24/25 16:53:00   Rupture Type: AROM  Fluid Color: Clear  Fluid Odor: None       Anesthesia    Method: Epidural       Labor Event Times      Labor onset date/time:        Dilation complete date/time:  3/24/25 20:38:00 EDT     Start pushing date/time:     Decision date/time (emergent ):            Delivery Details      Delivery Date: 3/24/25 Delivery Time: 21:42:00   Delivery Type: Vaginal, Spontaneous               Presentation    Presentation: Vertex       Shoulder Dystocia    Shoulder Dystocia Present?: No       Assisted Delivery Details    Forceps Attempted?: No  Vacuum Extractor Attempted?: No                           Cord    Vessels: 3 Vessels              Placenta    Date/Time: 3/24/2025 21:45:00  Removal: Spontaneous  Appearance: Intact  Disposition: Placenta Refrigerator       Lacerations           Blood Loss  Mother: Natalie Arora #[id number]     Start of Mother's Information      Delivery Blood Loss   Intrapartum & Postpartum: 25 0942 - 25 0035    Delivery Admission: 25 0653 - 25 0035         Intrapartum & Postpartum Delivery Admission    None                  End of Mother's Information  Mother: Natalie Arora #[id number]                Delivery Providers    Delivering clinician:      Provider Role     Obstetrician     Primary Nurse    Yun Currie, RN Primary [place] Nurse     NICU Nurse     Neonatologist     Anesthesiologist     Nurse Anesthetist     Nurse Practitioner     Midwife     Nursery Nurse     Respiratory Therapist     Scrub Tech     Assistant Surgeon               Assessment    Living Status: Living        Skin Color:   Heart Rate:   Reflex Irritability:   Muscle Tone:   Respiratory Effort:   Total:            1 Minute:    1    2    2    2    2

## 2025-03-26 VITALS
HEIGHT: 64 IN | HEART RATE: 76 BPM | DIASTOLIC BLOOD PRESSURE: 73 MMHG | SYSTOLIC BLOOD PRESSURE: 125 MMHG | BODY MASS INDEX: 53.73 KG/M2 | TEMPERATURE: 97.6 F | RESPIRATION RATE: 18 BRPM | OXYGEN SATURATION: 98 %

## 2025-03-26 PROCEDURE — 6370000000 HC RX 637 (ALT 250 FOR IP): Performed by: OBSTETRICS & GYNECOLOGY

## 2025-03-26 RX ORDER — IBUPROFEN 800 MG/1
800 TABLET, FILM COATED ORAL EVERY 8 HOURS PRN
Qty: 30 TABLET | Refills: 0 | Status: SHIPPED | OUTPATIENT
Start: 2025-03-26

## 2025-03-26 RX ADMIN — IBUPROFEN 800 MG: 800 TABLET, FILM COATED ORAL at 06:38

## 2025-03-26 ASSESSMENT — PAIN DESCRIPTION - LOCATION: LOCATION: ABDOMEN;VAGINA

## 2025-03-26 ASSESSMENT — PAIN SCALES - GENERAL: PAINLEVEL_OUTOF10: 6

## 2025-03-26 ASSESSMENT — PAIN DESCRIPTION - DESCRIPTORS: DESCRIPTORS: DISCOMFORT;CRAMPING

## 2025-03-26 NOTE — DISCHARGE SUMMARY
[facility] Obstetrical Discharge Form         Patients Name  Natalie Arora    Gestational Age:  39w2d    Antepartum complications: Increased BP     Date of Delivery:   3-24-25      Type of Delivery:   vaginal, spontaneous    Delivered By:                 Baby:       Information for the patient's :  Naomi Arora [[id number]]      Anesthesia:    Epidural     Intrapartum complications: None    Feeding method:   both breast and bottle - Similac with iron    Postpartum complications: none    Discharge Meds:       Medication List        START taking these medications      ibuprofen 800 MG tablet  Commonly known as: ADVIL;MOTRIN  Take 1 tablet by mouth every 8 hours as needed for Pain            CONTINUE taking these medications      Misc. Devices Kit  1 kit by Does not apply route daily as needed (blood pressure kit) blood pressure kit     PRENATAL+DHA PO               Where to Get Your Medications        These medications were sent to [facility]/pharmacy #[id number] - [place], [place] - [address] - P [phone] - F [phone]  [address] [place] [address]      Phone: [phone]   ibuprofen 800 MG tablet         Discharge Date:   3/26/2025  Condition:    Stable Home    Plan:   Follow up    in 6 week(s)

## 2025-03-28 ENCOUNTER — OFFICE VISIT (OUTPATIENT)
Dept: INTERNAL MEDICINE | Age: 30
End: 2025-03-28
Payer: COMMERCIAL

## 2025-03-28 VITALS
WEIGHT: 293 LBS | TEMPERATURE: 98.2 F | HEART RATE: 95 BPM | BODY MASS INDEX: 50.02 KG/M2 | SYSTOLIC BLOOD PRESSURE: 142 MMHG | DIASTOLIC BLOOD PRESSURE: 86 MMHG | RESPIRATION RATE: 18 BRPM | OXYGEN SATURATION: 97 % | HEIGHT: 64 IN

## 2025-03-28 DIAGNOSIS — F90.9 ATTENTION DEFICIT HYPERACTIVITY DISORDER (ADHD), UNSPECIFIED ADHD TYPE: ICD-10-CM

## 2025-03-28 DIAGNOSIS — F50.89 PICA: Primary | ICD-10-CM

## 2025-03-28 DIAGNOSIS — Z23 NEED FOR DIPHTHERIA-TETANUS-PERTUSSIS (TDAP) VACCINE: ICD-10-CM

## 2025-03-28 DIAGNOSIS — F41.9 ANXIETY: ICD-10-CM

## 2025-03-28 DIAGNOSIS — D50.9 MICROCYTIC ANEMIA: ICD-10-CM

## 2025-03-28 LAB
BASOPHILS ABSOLUTE: 0.04 K/UL (ref 0–0.2)
BASOPHILS RELATIVE PERCENT: 0 % (ref 0–2)
EOSINOPHILS ABSOLUTE: 0.33 K/UL (ref 0.05–0.5)
EOSINOPHILS RELATIVE PERCENT: 3 % (ref 0–6)
FERRITIN: 12 NG/ML
HCT VFR BLD CALC: 28.2 % (ref 34–48)
HEMOGLOBIN: 8.2 G/DL (ref 11.5–15.5)
IMMATURE GRANULOCYTES %: 1 % (ref 0–5)
IMMATURE GRANULOCYTES ABSOLUTE: 0.06 K/UL (ref 0–0.58)
IRON % SATURATION: 7 % (ref 15–50)
IRON: 35 UG/DL (ref 37–145)
LYMPHOCYTES ABSOLUTE: 2.27 K/UL (ref 1.5–4)
LYMPHOCYTES RELATIVE PERCENT: 21 % (ref 20–42)
MCH RBC QN AUTO: 18.4 PG (ref 26–35)
MCHC RBC AUTO-ENTMCNC: 29.1 G/DL (ref 32–34.5)
MCV RBC AUTO: 63.4 FL (ref 80–99.9)
MONOCYTES ABSOLUTE: 0.56 K/UL (ref 0.1–0.95)
MONOCYTES RELATIVE PERCENT: 5 % (ref 2–12)
NEUTROPHILS ABSOLUTE: 7.42 K/UL (ref 1.8–7.3)
NEUTROPHILS RELATIVE PERCENT: 69 % (ref 43–80)
PDW BLD-RTO: 18.3 % (ref 11.5–15)
PLATELET, FLUORESCENCE: 259 K/UL (ref 130–450)
PMV BLD AUTO: ABNORMAL FL (ref 7–12)
RBC # BLD: 4.45 M/UL (ref 3.5–5.5)
RBC # BLD: ABNORMAL 10*6/UL
TOTAL IRON BINDING CAPACITY: 470 UG/DL (ref 250–450)
WBC # BLD: 10.7 K/UL (ref 4.5–11.5)

## 2025-03-28 PROCEDURE — 99213 OFFICE O/P EST LOW 20 MIN: CPT

## 2025-03-28 PROCEDURE — 90715 TDAP VACCINE 7 YRS/> IM: CPT | Performed by: INTERNAL MEDICINE

## 2025-03-28 PROCEDURE — 36415 COLL VENOUS BLD VENIPUNCTURE: CPT | Performed by: INTERNAL MEDICINE

## 2025-03-28 PROCEDURE — 99212 OFFICE O/P EST SF 10 MIN: CPT

## 2025-03-28 ASSESSMENT — PATIENT HEALTH QUESTIONNAIRE - PHQ9
2. FEELING DOWN, DEPRESSED OR HOPELESS: NOT AT ALL
SUM OF ALL RESPONSES TO PHQ QUESTIONS 1-9: 1
1. LITTLE INTEREST OR PLEASURE IN DOING THINGS: SEVERAL DAYS
1. LITTLE INTEREST OR PLEASURE IN DOING THINGS: SEVERAL DAYS
SUM OF ALL RESPONSES TO PHQ QUESTIONS 1-9: 1
SUM OF ALL RESPONSES TO PHQ9 QUESTIONS 1 & 2: 1
SUM OF ALL RESPONSES TO PHQ QUESTIONS 1-9: 1
2. FEELING DOWN, DEPRESSED OR HOPELESS: NOT AT ALL
SUM OF ALL RESPONSES TO PHQ QUESTIONS 1-9: 1

## 2025-03-28 NOTE — PROGRESS NOTES
[facility]  Internal Medicine Residency Clinic    Attending Physician Statement  I have discussed the case, including pertinent history and exam findings with the resident physician.  I agree with the assessment, plan and orders as documented by the resident. I have reviewed all pertinent PMHx, PSHx, FamHx, SocialHx, medications, and allergies and updated history as appropriate.    Patient here for routine follow up of medical problems.     HTN  -no vision changes or headache since delivery of baby  -rechecking BP today and if still elevated then plan for antihypertensive treatment     PICA   -cat was eating sand during pregnancy; states that she bought it from the [email or web address] and was not eating sand from the back yard; she states that she is no longer eating sand   -patinet having appropriate bowel movements; if patient stops bbeing able to eat or defecate she needs to go to ED immediately and needs to tell providers if she eats anything like this in the future that is not food safe     Depression  -referral to Tanya for further evaluation      HCM  -tdap      Remainder of medical problems as per resident note.    Ifeanyi Underwood Jr, DO  3/28/25    
Pt received venipuncture blood draw left ac area. Tolerated well with no adverse reactions. Sent one green tube and one lavender tube to lab.  
are normal.      Palpations: Abdomen is soft.   Musculoskeletal:         General: Normal range of motion.      Cervical back: Normal range of motion.      Right lower leg: Edema present.      Left lower leg: Edema present.   Skin:     General: Skin is warm.   Neurological:      General: No focal deficit present.      Mental Status: She is alert and oriented to person, place, and time.         PLAN:  Post partum hypertension   Initial /92, repeat 142/86   Denies visual changes, headache, chest pain- patient instructed to present to ED if any of these occur    Microcytic anemia, pica   CBC, iron panel drawn today   Patient states she is no longer consuming sand   Will follow up on results w/ patient & initiate iron supplementation if indicated    Anxiety, ADHD   Referral placed to Tanya Chen for resources in community as no longer able to be seen by previous psychiatrist    Need for TDAP vaccination   Given today      RTC in 4 weeks    I have reviewed my findings and recommendations with Natalie Arora and Dr Yasmine Gonzalez MD PGY-3  3/28/2025 2:50 PM

## 2025-03-28 NOTE — PATIENT INSTRUCTIONS
Dear Natalie Arora,    Thank you for coming to your appointment today. I hope we have addressed all of your needs.     Please make sure to do the following:  - We will see each other again in 1 month  - If you have any changes to your vision, headaches, or chest pain please go to the emergency room    Call for a sooner appointment if you develop new or worsening symptoms    Have a great day!    Sincerely,  Caren Gonzalez MD PGY-3

## 2025-03-31 ENCOUNTER — TELEPHONE (OUTPATIENT)
Dept: INTERNAL MEDICINE CLINIC | Age: 30
End: 2025-03-31

## 2025-03-31 DIAGNOSIS — D50.9 IRON DEFICIENCY ANEMIA, UNSPECIFIED IRON DEFICIENCY ANEMIA TYPE: Primary | ICD-10-CM

## 2025-04-01 ENCOUNTER — TELEPHONE (OUTPATIENT)
Dept: INTERNAL MEDICINE | Age: 30
End: 2025-04-01

## 2025-04-01 NOTE — TELEPHONE ENCOUNTER
LISW attempted to call pt related to referral, left message and provided contact information for return call.

## 2025-04-29 ENCOUNTER — TELEMEDICINE (OUTPATIENT)
Age: 30
End: 2025-04-29
Payer: COMMERCIAL

## 2025-04-29 DIAGNOSIS — F41.1 GENERALIZED ANXIETY DISORDER: Primary | ICD-10-CM

## 2025-04-29 PROCEDURE — 90791 PSYCH DIAGNOSTIC EVALUATION: CPT | Performed by: SOCIAL WORKER

## 2025-05-01 ASSESSMENT — ANXIETY QUESTIONNAIRES
GAD7 TOTAL SCORE: 11
7. FEELING AFRAID AS IF SOMETHING AWFUL MIGHT HAPPEN: SEVERAL DAYS
5. BEING SO RESTLESS THAT IT IS HARD TO SIT STILL: SEVERAL DAYS
4. TROUBLE RELAXING: MORE THAN HALF THE DAYS
3. WORRYING TOO MUCH ABOUT DIFFERENT THINGS: MORE THAN HALF THE DAYS
1. FEELING NERVOUS, ANXIOUS, OR ON EDGE: MORE THAN HALF THE DAYS
2. NOT BEING ABLE TO STOP OR CONTROL WORRYING: MORE THAN HALF THE DAYS
IF YOU CHECKED OFF ANY PROBLEMS ON THIS QUESTIONNAIRE, HOW DIFFICULT HAVE THESE PROBLEMS MADE IT FOR YOU TO DO YOUR WORK, TAKE CARE OF THINGS AT HOME, OR GET ALONG WITH OTHER PEOPLE: SOMEWHAT DIFFICULT
6. BECOMING EASILY ANNOYED OR IRRITABLE: SEVERAL DAYS

## 2025-05-01 NOTE — PROGRESS NOTES
ADULT BEHAVIORAL HEALTH ASSESSMENT  Tanya Chen MSW, LISW    Visit Date: 4/29/2025   Time of appointment:  10:58   Time spent with Patient: 30 minutes.   This is patient's first appointment.    Reason for Consult:  Anxiety     Referring Provider/PCP:    No ref. provider found  Caren Gonzalez MD      Pt provided informed consent for the behavioral health program. Discussed with patient model of service to include the limits of confidentiality (i.e. abuse reporting, suicide intervention, etc.) and short-term intervention focused approach.       PRESENTING PROBLEM AND HISTORY  Natalie is a 29 y.o. female who presents for new evaluation and treatment of anxiety.  She has the following symptoms: feeling nervous, anxious, or on edge. Onset of symptoms was approximately several years ago.  Symptoms have been stable since that time.  She denies current suicidal and homicidal ideation.  Family history significant for no psychiatric illness.  Risk factors: none. Natalie reports in her past she treated with Dr. Torres for anxiety, depression, and ADHD. Pt reports when she was pregnant she discontinued her medication. Pt reports currently symptoms of generalized anxiety disorder are the more increased symptoms. Pt had daughter one month ago. Pt reports no depression and reports some increased anxiety but pt reports she has always displayed anxiety symptoms prior to pregnancy.    MENTAL STATUS EXAM  Mood was euthymic with congruent affect.   Suicidal ideation was denied.   Homicidal ideation was denied.   Hygiene was good .  Dress was neat.   Behavior was Within Normal Limits with No self-report of difficulties ambulating.   Attitude was Cooperative, Engageable, and Friendly.  Eye-contact was good.  Speech: rate - WNL, rhythm -  WNL, volume - WNL  Verbalizations were goal directed.  Thought processes were intact and goal-oriented without evidence of delusions, hallucinations, obsessions, or nubia; with little cognitive

## 2025-05-13 ENCOUNTER — TELEMEDICINE (OUTPATIENT)
Age: 30
End: 2025-05-13
Payer: COMMERCIAL

## 2025-05-13 DIAGNOSIS — F41.1 GENERALIZED ANXIETY DISORDER: Primary | ICD-10-CM

## 2025-05-13 PROCEDURE — 90834 PSYTX W PT 45 MINUTES: CPT | Performed by: SOCIAL WORKER

## 2025-05-13 NOTE — PROGRESS NOTES
ADULT BEHAVIORAL HEALTH FOLLOW UP  Tanya ChenGLADIS,LISW      Visit Date: 2025   Time of appointment:  11:00   Time spent with Patient: 45 minutes.   This is patient's second appointment.    Reason for Consult:  Anxiety     Referring Provider/PCP:    No ref. provider found  Caren Gonzalez MD      Pt provided informed consent for the behavioral health program. Discussed with patient model of service to include the limits of confidentiality (i.e. abuse reporting, suicide intervention, etc.) and short-term intervention focused approach.  Pt indicated understanding.    SUBJECTIVE  Natalie is a 29 y.o. female who presents for follow up of anxiety.          MENTAL STATUS EXAM  Mood was euthymic with congruent affect.   Suicidal ideation was denied.   Homicidal ideation was denied.   Hygiene was good .  Dress was neat.   Behavior was Within Normal Limits with No self-report of difficulties ambulating.   Attitude was Cooperative, Engageable, and Friendly.  Eye-contact was good.  Speech: rate - WNL, rhythm - WNL, volume - WNL.  Verbalizations were goal directed.  Thought processes were intact and goal-oriented without evidence of delusions, hallucinations, obsessions, or nubia; with little cognitive distortions.   Associations were characterized by intact cognitive processes.  Pt was oriented to person, place, time, and general circumstances;  recent:  good.  Insight and judgment were estimated to be excellent, AEB, a good  understanding of cyclical maladaptive patterns, and the ability to use insight to inform behavior change.       ASSESSMENT  Natalie Arora presented to the appointment today for evaluation and treatment of symptoms of anxiety.  She is currently deemed no risk to herself or others and meets criteria for generalized anxiety. Natalie reports she is aware of her anxiety triggers. Pt reports she has been working with her older son to be gentle with  daughter as she has anxiety surrounding this.

## 2025-05-27 ENCOUNTER — TELEMEDICINE (OUTPATIENT)
Age: 30
End: 2025-05-27
Payer: COMMERCIAL

## 2025-05-27 DIAGNOSIS — F41.1 GENERALIZED ANXIETY DISORDER: Primary | ICD-10-CM

## 2025-05-27 PROCEDURE — 90832 PSYTX W PT 30 MINUTES: CPT | Performed by: SOCIAL WORKER

## 2025-05-27 NOTE — PROGRESS NOTES
ADULT BEHAVIORAL HEALTH FOLLOW UP  Tanya GustavoMSCESAR,LISW      Visit Date: 5/27/2025   Time of appointment:  12:05  Time spent with Patient: 30 minutes.   This is patient's third appointment.    Reason for Consult:  Anxiety     Referring Provider/PCP:    No ref. provider found  Caren Gonzalez MD      Pt provided informed consent for the behavioral health program. Discussed with patient model of service to include the limits of confidentiality (i.e. abuse reporting, suicide intervention, etc.) and short-term intervention focused approach.  Pt indicated understanding.    SUBJECTIVE  Natalie is a 29 y.o. female who presents for follow up of anxiety.          MENTAL STATUS EXAM  Mood was euthymic with congruent affect.   Suicidal ideation was denied.   Homicidal ideation was denied.   Hygiene was good .  Dress was neat.   Behavior was Within Normal Limits with No self-report of difficulties ambulating.   Attitude was Cooperative, Engageable, and Friendly.  Eye-contact was good.  Speech: rate - WNL, rhythm - WNL, volume - WNL.  Verbalizations were goal directed.  Thought processes were intact and goal-oriented without evidence of delusions, hallucinations, obsessions, or nubia; with little cognitive distortions.   Associations were characterized by intact cognitive processes.  Pt was oriented to person, place, time, and general circumstances;  recent:  good.  Insight and judgment were estimated to be excellent, AEB, a good  understanding of cyclical maladaptive patterns, and the ability to use insight to inform behavior change.       ASSESSMENT  Natalie Arora presented to the appointment today for evaluation and treatment of symptoms of anxiety.  She is currently deemed no risk to herself or others and meets criteria for generalized anxiety. Natalie reports progress with intrusive thoughts and doing better as it relates to postpartum. Natalie reports pt  is willing to also receive some counseling support at

## 2025-06-12 ENCOUNTER — TELEPHONE (OUTPATIENT)
Age: 30
End: 2025-06-12

## 2025-06-12 NOTE — TELEPHONE ENCOUNTER
LISW left message for pt related to appointment, provided contact information if pt would like to reschedule.

## 2025-06-17 ENCOUNTER — TELEPHONE (OUTPATIENT)
Age: 30
End: 2025-06-17

## 2025-06-17 LAB — SURGICAL PATHOLOGY REPORT: NORMAL

## 2025-06-17 NOTE — TELEPHONE ENCOUNTER
LISW made contact to pt for follow up. Pt reports she feels she is doing well at this time related to counseling services. LISW to follow up with pt after leave at pt request. ELIGIOW also provided additional resources if needed.

## 2025-06-24 NOTE — DISCHARGE INSTRUCTIONS
have bloody vaginal discharge. You may also pass some blood clots that shouldn't be bigger than an egg. Over the next 6 weeks or so, your bleeding should decrease a little every day and slowly change to a pinkish and then whitish discharge.  For cramps or mild pain, try an over-the-counter pain medicine, such as acetaminophen (Tylenol) or ibuprofen (Advil, Motrin). Read and follow all instructions on the label.  To ease pain around the vagina or from hemorrhoids:  Put ice or a cold pack on the area for 10 to 20 minutes at a time. Put a thin cloth between the ice and your skin.  Try sitting in a few inches of warm water (sitz bath) when you can or after bowel movements.  Clean yourself with a gentle squeeze of warm water from a bottle instead of wiping with toilet paper.  Use witch hazel or hemorrhoid pads (such as Tucks).  Try using a cold compress for sore and swollen breasts. And wear a supportive bra that fits.  Ease constipation by drinking plenty of fluids and eating high-fiber foods. Ask your doctor or midwife about over-the-counter stool softeners.  Activity  Rest when you can.  Ask for help from family or friends when you need it.  If you can, have another adult in your home for at least 2 or 3 days after birth.  When you feel ready, try to get some exercise every day. For many people, walking is a good choice. Don't do any heavy exercise until your doctor or midwife says it's okay.  Ask your doctor or midwife when it is okay to have vaginal sex.  If you don't want to get pregnant, talk to your doctor or midwife about birth control options. You can get pregnant even before your period returns. Also, you can get pregnant while you are breastfeeding.  Talk to your doctor or midwife if you want to get pregnant again. They can talk to you about when it is safe.  Emotional health  It's normal to have some sadness, anxiety, and mood swings after delivery. It may help to talk with a trusted friend or family member.  None